# Patient Record
Sex: MALE | Race: WHITE | NOT HISPANIC OR LATINO | Employment: OTHER | URBAN - NONMETROPOLITAN AREA
[De-identification: names, ages, dates, MRNs, and addresses within clinical notes are randomized per-mention and may not be internally consistent; named-entity substitution may affect disease eponyms.]

---

## 2017-09-25 ENCOUNTER — APPOINTMENT (OUTPATIENT)
Dept: GENERAL RADIOLOGY | Facility: HOSPITAL | Age: 82
End: 2017-09-25

## 2017-09-25 PROCEDURE — 71101 X-RAY EXAM UNILAT RIBS/CHEST: CPT

## 2018-09-08 ENCOUNTER — HOSPITAL ENCOUNTER (INPATIENT)
Age: 83
LOS: 5 days | Discharge: NURSING, REHAB OR LONG-TERM CARE | DRG: 481 | End: 2018-09-13
Attending: EMERGENCY MEDICINE | Admitting: INTERNAL MEDICINE
Payer: MEDICARE

## 2018-09-08 ENCOUNTER — APPOINTMENT (OUTPATIENT)
Dept: GENERAL RADIOLOGY | Age: 83
DRG: 481 | End: 2018-09-08
Payer: MEDICARE

## 2018-09-08 DIAGNOSIS — S72.001A CLOSED FRACTURE OF RIGHT HIP, INITIAL ENCOUNTER (HCC): Primary | ICD-10-CM

## 2018-09-08 PROBLEM — S72.001S: Status: ACTIVE | Noted: 2018-09-08

## 2018-09-08 LAB
ANION GAP SERPL CALCULATED.3IONS-SCNC: 18 MMOL/L (ref 7–19)
APTT: 30.4 SEC (ref 26–36.2)
BUN BLDV-MCNC: 16 MG/DL (ref 8–23)
CALCIUM SERPL-MCNC: 8.9 MG/DL (ref 8.2–9.6)
CHLORIDE BLD-SCNC: 100 MMOL/L (ref 98–111)
CO2: 21 MMOL/L (ref 22–29)
CREAT SERPL-MCNC: 0.8 MG/DL (ref 0.5–1.2)
ETHANOL: 33 MG/DL (ref 0–0.08)
GFR NON-AFRICAN AMERICAN: >60
GLUCOSE BLD-MCNC: 149 MG/DL (ref 74–109)
HCT VFR BLD CALC: 50.7 % (ref 42–52)
HEMOGLOBIN: 17.1 G/DL (ref 14–18)
INR BLD: 1.01 (ref 0.88–1.18)
MCH RBC QN AUTO: 34.7 PG (ref 27–31)
MCHC RBC AUTO-ENTMCNC: 33.7 G/DL (ref 33–37)
MCV RBC AUTO: 102.8 FL (ref 80–94)
PDW BLD-RTO: 13.4 % (ref 11.5–14.5)
PLATELET # BLD: 143 K/UL (ref 130–400)
PMV BLD AUTO: 9.9 FL (ref 9.4–12.4)
POTASSIUM SERPL-SCNC: 4.2 MMOL/L (ref 3.5–5)
PROTHROMBIN TIME: 13.2 SEC (ref 12–14.6)
RBC # BLD: 4.93 M/UL (ref 4.7–6.1)
SODIUM BLD-SCNC: 139 MMOL/L (ref 136–145)
TROPONIN: 0.04 NG/ML (ref 0–0.03)
WBC # BLD: 9.3 K/UL (ref 4.8–10.8)

## 2018-09-08 PROCEDURE — 96374 THER/PROPH/DIAG INJ IV PUSH: CPT

## 2018-09-08 PROCEDURE — 99285 EMERGENCY DEPT VISIT HI MDM: CPT

## 2018-09-08 PROCEDURE — 80048 BASIC METABOLIC PNL TOTAL CA: CPT

## 2018-09-08 PROCEDURE — 36415 COLL VENOUS BLD VENIPUNCTURE: CPT

## 2018-09-08 PROCEDURE — 73502 X-RAY EXAM HIP UNI 2-3 VIEWS: CPT

## 2018-09-08 PROCEDURE — 1210000000 HC MED SURG R&B

## 2018-09-08 PROCEDURE — 93005 ELECTROCARDIOGRAM TRACING: CPT

## 2018-09-08 PROCEDURE — 85730 THROMBOPLASTIN TIME PARTIAL: CPT

## 2018-09-08 PROCEDURE — 71045 X-RAY EXAM CHEST 1 VIEW: CPT

## 2018-09-08 PROCEDURE — 85027 COMPLETE CBC AUTOMATED: CPT

## 2018-09-08 PROCEDURE — 84484 ASSAY OF TROPONIN QUANT: CPT

## 2018-09-08 PROCEDURE — G0480 DRUG TEST DEF 1-7 CLASSES: HCPCS

## 2018-09-08 PROCEDURE — 6360000002 HC RX W HCPCS: Performed by: EMERGENCY MEDICINE

## 2018-09-08 PROCEDURE — 85610 PROTHROMBIN TIME: CPT

## 2018-09-08 RX ORDER — DIPHENHYDRAMINE HCL 25 MG
25 TABLET ORAL DAILY
COMMUNITY
End: 2020-09-18

## 2018-09-08 RX ORDER — MORPHINE SULFATE/0.9% NACL/PF 1 MG/ML
2 SYRINGE (ML) INJECTION ONCE
Status: COMPLETED | OUTPATIENT
Start: 2018-09-08 | End: 2018-09-08

## 2018-09-08 RX ADMIN — Medication 2 MG: at 23:45

## 2018-09-08 ASSESSMENT — ENCOUNTER SYMPTOMS
SHORTNESS OF BREATH: 0
DIARRHEA: 0
ABDOMINAL PAIN: 0
BACK PAIN: 0
VOMITING: 0

## 2018-09-08 ASSESSMENT — PAIN SCALES - GENERAL
PAINLEVEL_OUTOF10: 8
PAINLEVEL_OUTOF10: 3

## 2018-09-08 ASSESSMENT — PAIN DESCRIPTION - ORIENTATION: ORIENTATION: RIGHT

## 2018-09-08 ASSESSMENT — PAIN DESCRIPTION - LOCATION: LOCATION: HIP

## 2018-09-09 ENCOUNTER — ANESTHESIA EVENT (OUTPATIENT)
Dept: OPERATING ROOM | Age: 83
DRG: 481 | End: 2018-09-09
Payer: MEDICARE

## 2018-09-09 ENCOUNTER — APPOINTMENT (OUTPATIENT)
Dept: CT IMAGING | Age: 83
DRG: 481 | End: 2018-09-09
Payer: MEDICARE

## 2018-09-09 ENCOUNTER — APPOINTMENT (OUTPATIENT)
Dept: GENERAL RADIOLOGY | Age: 83
DRG: 481 | End: 2018-09-09
Payer: MEDICARE

## 2018-09-09 ENCOUNTER — ANESTHESIA (OUTPATIENT)
Dept: OPERATING ROOM | Age: 83
DRG: 481 | End: 2018-09-09
Payer: MEDICARE

## 2018-09-09 VITALS
OXYGEN SATURATION: 100 % | TEMPERATURE: 96.6 F | RESPIRATION RATE: 8 BRPM | SYSTOLIC BLOOD PRESSURE: 154 MMHG | DIASTOLIC BLOOD PRESSURE: 85 MMHG

## 2018-09-09 PROBLEM — Z87.81 S/P RIGHT HIP FRACTURE: Status: ACTIVE | Noted: 2018-09-08

## 2018-09-09 LAB
ABO/RH: NORMAL
ANTIBODY SCREEN: NORMAL
BACTERIA: NEGATIVE /HPF
BILIRUBIN URINE: NEGATIVE
BILIRUBIN URINE: NEGATIVE
BLOOD, URINE: ABNORMAL
BLOOD, URINE: NEGATIVE
CLARITY: CLEAR
CLARITY: CLEAR
COLOR: ABNORMAL
COLOR: YELLOW
EPITHELIAL CELLS, UA: 1 /HPF (ref 0–5)
GLUCOSE URINE: 500 MG/DL
GLUCOSE URINE: NEGATIVE MG/DL
HYALINE CASTS: 2 /HPF (ref 0–8)
KETONES, URINE: NEGATIVE MG/DL
KETONES, URINE: NEGATIVE MG/DL
LEUKOCYTE ESTERASE, URINE: NEGATIVE
LEUKOCYTE ESTERASE, URINE: NEGATIVE
NITRITE, URINE: NEGATIVE
NITRITE, URINE: NEGATIVE
PH UA: 6
PH UA: 6
PROTEIN UA: ABNORMAL MG/DL
PROTEIN UA: NEGATIVE MG/DL
RBC UA: 11 /HPF (ref 0–4)
SPECIFIC GRAVITY UA: 1.02
SPECIFIC GRAVITY UA: 1.02
URINE REFLEX TO CULTURE: ABNORMAL
UROBILINOGEN, URINE: 0.2 E.U./DL
UROBILINOGEN, URINE: 0.2 E.U./DL
WBC UA: 3 /HPF (ref 0–5)

## 2018-09-09 PROCEDURE — 6360000002 HC RX W HCPCS: Performed by: ORTHOPAEDIC SURGERY

## 2018-09-09 PROCEDURE — 1210000000 HC MED SURG R&B

## 2018-09-09 PROCEDURE — 7100000000 HC PACU RECOVERY - FIRST 15 MIN: Performed by: ORTHOPAEDIC SURGERY

## 2018-09-09 PROCEDURE — 7100000001 HC PACU RECOVERY - ADDTL 15 MIN: Performed by: ORTHOPAEDIC SURGERY

## 2018-09-09 PROCEDURE — 86901 BLOOD TYPING SEROLOGIC RH(D): CPT

## 2018-09-09 PROCEDURE — G8979 MOBILITY GOAL STATUS: HCPCS

## 2018-09-09 PROCEDURE — 97162 PT EVAL MOD COMPLEX 30 MIN: CPT

## 2018-09-09 PROCEDURE — 99285 EMERGENCY DEPT VISIT HI MDM: CPT | Performed by: EMERGENCY MEDICINE

## 2018-09-09 PROCEDURE — 73502 X-RAY EXAM HIP UNI 2-3 VIEWS: CPT

## 2018-09-09 PROCEDURE — 70450 CT HEAD/BRAIN W/O DYE: CPT

## 2018-09-09 PROCEDURE — 3700000001 HC ADD 15 MINUTES (ANESTHESIA): Performed by: ORTHOPAEDIC SURGERY

## 2018-09-09 PROCEDURE — 2580000003 HC RX 258: Performed by: INTERNAL MEDICINE

## 2018-09-09 PROCEDURE — 6360000002 HC RX W HCPCS

## 2018-09-09 PROCEDURE — 3700000000 HC ANESTHESIA ATTENDED CARE: Performed by: ORTHOPAEDIC SURGERY

## 2018-09-09 PROCEDURE — 2709999900 HC NON-CHARGEABLE SUPPLY: Performed by: ORTHOPAEDIC SURGERY

## 2018-09-09 PROCEDURE — 72125 CT NECK SPINE W/O DYE: CPT

## 2018-09-09 PROCEDURE — 86850 RBC ANTIBODY SCREEN: CPT

## 2018-09-09 PROCEDURE — 3600000004 HC SURGERY LEVEL 4 BASE: Performed by: ORTHOPAEDIC SURGERY

## 2018-09-09 PROCEDURE — 97750 PHYSICAL PERFORMANCE TEST: CPT

## 2018-09-09 PROCEDURE — 6370000000 HC RX 637 (ALT 250 FOR IP): Performed by: ORTHOPAEDIC SURGERY

## 2018-09-09 PROCEDURE — 86900 BLOOD TYPING SEROLOGIC ABO: CPT

## 2018-09-09 PROCEDURE — 3209999900 FLUORO FOR SURGICAL PROCEDURES

## 2018-09-09 PROCEDURE — 94664 DEMO&/EVAL PT USE INHALER: CPT

## 2018-09-09 PROCEDURE — 36415 COLL VENOUS BLD VENIPUNCTURE: CPT

## 2018-09-09 PROCEDURE — 0QS604Z REPOSITION RIGHT UPPER FEMUR WITH INTERNAL FIXATION DEVICE, OPEN APPROACH: ICD-10-PCS | Performed by: ORTHOPAEDIC SURGERY

## 2018-09-09 PROCEDURE — G8978 MOBILITY CURRENT STATUS: HCPCS

## 2018-09-09 PROCEDURE — 3600000014 HC SURGERY LEVEL 4 ADDTL 15MIN: Performed by: ORTHOPAEDIC SURGERY

## 2018-09-09 PROCEDURE — 2720000010 HC SURG SUPPLY STERILE: Performed by: ORTHOPAEDIC SURGERY

## 2018-09-09 PROCEDURE — 81003 URINALYSIS AUTO W/O SCOPE: CPT

## 2018-09-09 PROCEDURE — C1713 ANCHOR/SCREW BN/BN,TIS/BN: HCPCS | Performed by: ORTHOPAEDIC SURGERY

## 2018-09-09 PROCEDURE — 2500000003 HC RX 250 WO HCPCS: Performed by: ANESTHESIOLOGY

## 2018-09-09 PROCEDURE — 6360000002 HC RX W HCPCS: Performed by: ANESTHESIOLOGY

## 2018-09-09 PROCEDURE — 99222 1ST HOSP IP/OBS MODERATE 55: CPT | Performed by: INTERNAL MEDICINE

## 2018-09-09 PROCEDURE — 51798 US URINE CAPACITY MEASURE: CPT

## 2018-09-09 PROCEDURE — 2580000003 HC RX 258: Performed by: ANESTHESIOLOGY

## 2018-09-09 PROCEDURE — 2720000001 HC MISC SURG SUPPLY STERILE $51-500: Performed by: ORTHOPAEDIC SURGERY

## 2018-09-09 PROCEDURE — 2580000003 HC RX 258: Performed by: ORTHOPAEDIC SURGERY

## 2018-09-09 DEVICE — ZNN CMN NAIL 11.5MMX21.5CM 130R
Type: IMPLANTABLE DEVICE | Site: HIP | Status: FUNCTIONAL
Brand: ZIMMER® NATURAL NAIL® SYSTEM

## 2018-09-09 DEVICE — ZNN CMN LAG SCREW 10.5X110
Type: IMPLANTABLE DEVICE | Site: HIP | Status: FUNCTIONAL
Brand: ZIMMER® NATURAL NAIL® SYSTEM

## 2018-09-09 DEVICE — SCREW BNE L32MM DIA5MM HEX HD DIA3.5MM CORT TIB TI ALLY FIX: Type: IMPLANTABLE DEVICE | Site: HIP | Status: FUNCTIONAL

## 2018-09-09 RX ORDER — ROCURONIUM BROMIDE 10 MG/ML
INJECTION, SOLUTION INTRAVENOUS PRN
Status: DISCONTINUED | OUTPATIENT
Start: 2018-09-09 | End: 2018-09-09 | Stop reason: SDUPTHER

## 2018-09-09 RX ORDER — SODIUM CHLORIDE 9 MG/ML
INJECTION, SOLUTION INTRAVENOUS CONTINUOUS
Status: DISCONTINUED | OUTPATIENT
Start: 2018-09-09 | End: 2018-09-09

## 2018-09-09 RX ORDER — ACETAMINOPHEN 325 MG/1
650 TABLET ORAL EVERY 4 HOURS PRN
Status: DISCONTINUED | OUTPATIENT
Start: 2018-09-09 | End: 2018-09-13 | Stop reason: HOSPADM

## 2018-09-09 RX ORDER — DIPHENHYDRAMINE HYDROCHLORIDE 50 MG/ML
12.5 INJECTION INTRAMUSCULAR; INTRAVENOUS
Status: DISCONTINUED | OUTPATIENT
Start: 2018-09-09 | End: 2018-09-09 | Stop reason: HOSPADM

## 2018-09-09 RX ORDER — HYDROMORPHONE HCL IN 0.9% NACL 0.5 MG/ML
0.25 SYRINGE (ML) INTRAVENOUS EVERY 5 MIN PRN
Status: DISCONTINUED | OUTPATIENT
Start: 2018-09-09 | End: 2018-09-09 | Stop reason: HOSPADM

## 2018-09-09 RX ORDER — CEFAZOLIN SODIUM 1 G/3ML
INJECTION, POWDER, FOR SOLUTION INTRAMUSCULAR; INTRAVENOUS PRN
Status: DISCONTINUED | OUTPATIENT
Start: 2018-09-09 | End: 2018-09-09 | Stop reason: SDUPTHER

## 2018-09-09 RX ORDER — TAMSULOSIN HYDROCHLORIDE 0.4 MG/1
0.4 CAPSULE ORAL DAILY
Status: DISCONTINUED | OUTPATIENT
Start: 2018-09-09 | End: 2018-09-13 | Stop reason: HOSPADM

## 2018-09-09 RX ORDER — OXYCODONE HYDROCHLORIDE AND ACETAMINOPHEN 5; 325 MG/1; MG/1
1 TABLET ORAL EVERY 4 HOURS PRN
Status: DISCONTINUED | OUTPATIENT
Start: 2018-09-09 | End: 2018-09-13 | Stop reason: HOSPADM

## 2018-09-09 RX ORDER — MORPHINE SULFATE/0.9% NACL/PF 1 MG/ML
2 SYRINGE (ML) INJECTION EVERY 5 MIN PRN
Status: DISCONTINUED | OUTPATIENT
Start: 2018-09-09 | End: 2018-09-09 | Stop reason: HOSPADM

## 2018-09-09 RX ORDER — MORPHINE SULFATE/0.9% NACL/PF 1 MG/ML
4 SYRINGE (ML) INJECTION EVERY 5 MIN PRN
Status: DISCONTINUED | OUTPATIENT
Start: 2018-09-09 | End: 2018-09-09 | Stop reason: HOSPADM

## 2018-09-09 RX ORDER — SODIUM CHLORIDE, SODIUM LACTATE, POTASSIUM CHLORIDE, CALCIUM CHLORIDE 600; 310; 30; 20 MG/100ML; MG/100ML; MG/100ML; MG/100ML
INJECTION, SOLUTION INTRAVENOUS CONTINUOUS PRN
Status: DISCONTINUED | OUTPATIENT
Start: 2018-09-09 | End: 2018-09-09 | Stop reason: SDUPTHER

## 2018-09-09 RX ORDER — GLYCOPYRROLATE 0.2 MG/ML
INJECTION INTRAMUSCULAR; INTRAVENOUS PRN
Status: DISCONTINUED | OUTPATIENT
Start: 2018-09-09 | End: 2018-09-09 | Stop reason: SDUPTHER

## 2018-09-09 RX ORDER — HYDROMORPHONE HCL IN 0.9% NACL 0.5 MG/ML
0.5 SYRINGE (ML) INTRAVENOUS EVERY 5 MIN PRN
Status: DISCONTINUED | OUTPATIENT
Start: 2018-09-09 | End: 2018-09-09 | Stop reason: HOSPADM

## 2018-09-09 RX ORDER — ONDANSETRON 2 MG/ML
4 INJECTION INTRAMUSCULAR; INTRAVENOUS EVERY 6 HOURS PRN
Status: DISCONTINUED | OUTPATIENT
Start: 2018-09-09 | End: 2018-09-13 | Stop reason: HOSPADM

## 2018-09-09 RX ORDER — EPHEDRINE SULFATE 50 MG/ML
INJECTION, SOLUTION INTRAVENOUS PRN
Status: DISCONTINUED | OUTPATIENT
Start: 2018-09-09 | End: 2018-09-09 | Stop reason: SDUPTHER

## 2018-09-09 RX ORDER — SODIUM CHLORIDE, SODIUM LACTATE, POTASSIUM CHLORIDE, CALCIUM CHLORIDE 600; 310; 30; 20 MG/100ML; MG/100ML; MG/100ML; MG/100ML
INJECTION, SOLUTION INTRAVENOUS CONTINUOUS
Status: DISCONTINUED | OUTPATIENT
Start: 2018-09-09 | End: 2018-09-12

## 2018-09-09 RX ORDER — ROPIVACAINE HYDROCHLORIDE 2 MG/ML
INJECTION, SOLUTION EPIDURAL; INFILTRATION; PERINEURAL PRN
Status: DISCONTINUED | OUTPATIENT
Start: 2018-09-09 | End: 2018-09-09 | Stop reason: HOSPADM

## 2018-09-09 RX ORDER — LIDOCAINE HYDROCHLORIDE 10 MG/ML
INJECTION, SOLUTION EPIDURAL; INFILTRATION; INTRACAUDAL; PERINEURAL PRN
Status: DISCONTINUED | OUTPATIENT
Start: 2018-09-09 | End: 2018-09-09 | Stop reason: SDUPTHER

## 2018-09-09 RX ORDER — ONDANSETRON 2 MG/ML
4 INJECTION INTRAMUSCULAR; INTRAVENOUS EVERY 6 HOURS PRN
Status: DISCONTINUED | OUTPATIENT
Start: 2018-09-09 | End: 2018-09-09

## 2018-09-09 RX ORDER — FENTANYL CITRATE 50 UG/ML
INJECTION, SOLUTION INTRAMUSCULAR; INTRAVENOUS PRN
Status: DISCONTINUED | OUTPATIENT
Start: 2018-09-09 | End: 2018-09-09 | Stop reason: SDUPTHER

## 2018-09-09 RX ORDER — DIPHENHYDRAMINE HCL 25 MG
25 TABLET ORAL DAILY
Status: DISCONTINUED | OUTPATIENT
Start: 2018-09-09 | End: 2018-09-13 | Stop reason: HOSPADM

## 2018-09-09 RX ORDER — SODIUM CHLORIDE 0.9 % (FLUSH) 0.9 %
10 SYRINGE (ML) INJECTION PRN
Status: DISCONTINUED | OUTPATIENT
Start: 2018-09-09 | End: 2018-09-13 | Stop reason: HOSPADM

## 2018-09-09 RX ORDER — FLUTICASONE PROPIONATE 50 MCG
2 SPRAY, SUSPENSION (ML) NASAL DAILY
Status: DISCONTINUED | OUTPATIENT
Start: 2018-09-09 | End: 2018-09-13 | Stop reason: HOSPADM

## 2018-09-09 RX ORDER — TRANEXAMIC ACID 100 MG/ML
15 INJECTION, SOLUTION INTRAVENOUS ONCE
Status: CANCELLED | OUTPATIENT
Start: 2018-09-09 | End: 2018-09-09

## 2018-09-09 RX ORDER — MORPHINE SULFATE/0.9% NACL/PF 1 MG/ML
4 SYRINGE (ML) INJECTION
Status: DISCONTINUED | OUTPATIENT
Start: 2018-09-09 | End: 2018-09-13 | Stop reason: HOSPADM

## 2018-09-09 RX ORDER — METOCLOPRAMIDE HYDROCHLORIDE 5 MG/ML
10 INJECTION INTRAMUSCULAR; INTRAVENOUS
Status: DISCONTINUED | OUTPATIENT
Start: 2018-09-09 | End: 2018-09-09 | Stop reason: HOSPADM

## 2018-09-09 RX ORDER — MEPERIDINE HYDROCHLORIDE 50 MG/ML
12.5 INJECTION INTRAMUSCULAR; INTRAVENOUS; SUBCUTANEOUS EVERY 5 MIN PRN
Status: DISCONTINUED | OUTPATIENT
Start: 2018-09-09 | End: 2018-09-09 | Stop reason: HOSPADM

## 2018-09-09 RX ORDER — LABETALOL HYDROCHLORIDE 5 MG/ML
5 INJECTION, SOLUTION INTRAVENOUS EVERY 10 MIN PRN
Status: DISCONTINUED | OUTPATIENT
Start: 2018-09-09 | End: 2018-09-09 | Stop reason: HOSPADM

## 2018-09-09 RX ORDER — DEXAMETHASONE SODIUM PHOSPHATE 10 MG/ML
INJECTION INTRAMUSCULAR; INTRAVENOUS PRN
Status: DISCONTINUED | OUTPATIENT
Start: 2018-09-09 | End: 2018-09-09 | Stop reason: SDUPTHER

## 2018-09-09 RX ORDER — 0.9 % SODIUM CHLORIDE 0.9 %
500 INTRAVENOUS SOLUTION INTRAVENOUS PRN
Status: DISCONTINUED | OUTPATIENT
Start: 2018-09-09 | End: 2018-09-13 | Stop reason: HOSPADM

## 2018-09-09 RX ORDER — DOCUSATE SODIUM 100 MG/1
100 CAPSULE, LIQUID FILLED ORAL 2 TIMES DAILY
Status: DISCONTINUED | OUTPATIENT
Start: 2018-09-09 | End: 2018-09-13 | Stop reason: HOSPADM

## 2018-09-09 RX ORDER — MORPHINE SULFATE/0.9% NACL/PF 1 MG/ML
2 SYRINGE (ML) INJECTION
Status: DISCONTINUED | OUTPATIENT
Start: 2018-09-09 | End: 2018-09-13 | Stop reason: HOSPADM

## 2018-09-09 RX ORDER — SODIUM CHLORIDE 0.9 % (FLUSH) 0.9 %
10 SYRINGE (ML) INJECTION EVERY 12 HOURS SCHEDULED
Status: DISCONTINUED | OUTPATIENT
Start: 2018-09-09 | End: 2018-09-13 | Stop reason: HOSPADM

## 2018-09-09 RX ORDER — MORPHINE SULFATE/0.9% NACL/PF 1 MG/ML
1 SYRINGE (ML) INJECTION EVERY 4 HOURS PRN
Status: DISCONTINUED | OUTPATIENT
Start: 2018-09-09 | End: 2018-09-09

## 2018-09-09 RX ORDER — BISACODYL 10 MG
10 SUPPOSITORY, RECTAL RECTAL DAILY PRN
Status: DISCONTINUED | OUTPATIENT
Start: 2018-09-09 | End: 2018-09-13 | Stop reason: HOSPADM

## 2018-09-09 RX ORDER — ONDANSETRON 2 MG/ML
INJECTION INTRAMUSCULAR; INTRAVENOUS PRN
Status: DISCONTINUED | OUTPATIENT
Start: 2018-09-09 | End: 2018-09-09 | Stop reason: SDUPTHER

## 2018-09-09 RX ORDER — SODIUM CHLORIDE 9 MG/ML
INJECTION, SOLUTION INTRAVENOUS CONTINUOUS PRN
Status: DISCONTINUED | OUTPATIENT
Start: 2018-09-09 | End: 2018-09-09 | Stop reason: SDUPTHER

## 2018-09-09 RX ORDER — PROPOFOL 10 MG/ML
INJECTION, EMULSION INTRAVENOUS PRN
Status: DISCONTINUED | OUTPATIENT
Start: 2018-09-09 | End: 2018-09-09 | Stop reason: SDUPTHER

## 2018-09-09 RX ORDER — ENALAPRILAT 2.5 MG/2ML
1.25 INJECTION INTRAVENOUS
Status: DISCONTINUED | OUTPATIENT
Start: 2018-09-09 | End: 2018-09-09 | Stop reason: HOSPADM

## 2018-09-09 RX ORDER — OXYCODONE HYDROCHLORIDE AND ACETAMINOPHEN 5; 325 MG/1; MG/1
2 TABLET ORAL EVERY 4 HOURS PRN
Status: DISCONTINUED | OUTPATIENT
Start: 2018-09-09 | End: 2018-09-13 | Stop reason: HOSPADM

## 2018-09-09 RX ORDER — HYDRALAZINE HYDROCHLORIDE 20 MG/ML
5 INJECTION INTRAMUSCULAR; INTRAVENOUS EVERY 10 MIN PRN
Status: DISCONTINUED | OUTPATIENT
Start: 2018-09-09 | End: 2018-09-09 | Stop reason: HOSPADM

## 2018-09-09 RX ORDER — PROMETHAZINE HYDROCHLORIDE 25 MG/ML
6.25 INJECTION, SOLUTION INTRAMUSCULAR; INTRAVENOUS
Status: DISCONTINUED | OUTPATIENT
Start: 2018-09-09 | End: 2018-09-09 | Stop reason: HOSPADM

## 2018-09-09 RX ADMIN — SODIUM CHLORIDE, POTASSIUM CHLORIDE, SODIUM LACTATE AND CALCIUM CHLORIDE: 600; 310; 30; 20 INJECTION, SOLUTION INTRAVENOUS at 22:24

## 2018-09-09 RX ADMIN — DOCUSATE SODIUM 100 MG: 100 CAPSULE, LIQUID FILLED ORAL at 20:20

## 2018-09-09 RX ADMIN — ACETAMINOPHEN 325 MG: 325 TABLET ORAL at 14:02

## 2018-09-09 RX ADMIN — NEOSTIGMINE METHYLSULFATE 4 MG: 1 INJECTION, SOLUTION INTRAMUSCULAR; INTRAVENOUS; SUBCUTANEOUS at 09:10

## 2018-09-09 RX ADMIN — TAMSULOSIN HYDROCHLORIDE 0.4 MG: 0.4 CAPSULE ORAL at 20:20

## 2018-09-09 RX ADMIN — CEFAZOLIN 2000 MG: 330 INJECTION, POWDER, FOR SOLUTION INTRAMUSCULAR; INTRAVENOUS at 08:38

## 2018-09-09 RX ADMIN — EPHEDRINE SULFATE 10 MG: 50 INJECTION, SOLUTION INTRAMUSCULAR; INTRAVENOUS; SUBCUTANEOUS at 08:40

## 2018-09-09 RX ADMIN — FENTANYL CITRATE 25 MCG: 50 INJECTION INTRAMUSCULAR; INTRAVENOUS at 08:20

## 2018-09-09 RX ADMIN — FENTANYL CITRATE 25 MCG: 50 INJECTION INTRAMUSCULAR; INTRAVENOUS at 08:45

## 2018-09-09 RX ADMIN — DEXAMETHASONE SODIUM PHOSPHATE 10 MG: 10 INJECTION INTRAMUSCULAR; INTRAVENOUS at 08:40

## 2018-09-09 RX ADMIN — FENTANYL CITRATE 50 MCG: 50 INJECTION INTRAMUSCULAR; INTRAVENOUS at 08:50

## 2018-09-09 RX ADMIN — ROCURONIUM BROMIDE 30 MG: 10 INJECTION INTRAVENOUS at 08:20

## 2018-09-09 RX ADMIN — RIVAROXABAN 10 MG: 10 TABLET, FILM COATED ORAL at 17:39

## 2018-09-09 RX ADMIN — PROPOFOL 150 MG: 10 INJECTION, EMULSION INTRAVENOUS at 08:20

## 2018-09-09 RX ADMIN — Medication 2 G: at 22:59

## 2018-09-09 RX ADMIN — SODIUM CHLORIDE: 9 INJECTION, SOLUTION INTRAVENOUS at 09:16

## 2018-09-09 RX ADMIN — FENTANYL CITRATE 50 MCG: 50 INJECTION INTRAMUSCULAR; INTRAVENOUS at 09:15

## 2018-09-09 RX ADMIN — Medication 2 G: at 17:39

## 2018-09-09 RX ADMIN — GLYCOPYRROLATE 0.6 MG: 0.2 INJECTION, SOLUTION INTRAMUSCULAR; INTRAVENOUS at 09:10

## 2018-09-09 RX ADMIN — ONDANSETRON HYDROCHLORIDE 4 MG: 2 INJECTION, SOLUTION INTRAMUSCULAR; INTRAVENOUS at 09:15

## 2018-09-09 RX ADMIN — LIDOCAINE HYDROCHLORIDE 50 MG: 10 INJECTION, SOLUTION EPIDURAL; INFILTRATION; INTRACAUDAL; PERINEURAL at 08:20

## 2018-09-09 RX ADMIN — Medication 2 MG: at 10:08

## 2018-09-09 RX ADMIN — EPHEDRINE SULFATE 5 MG: 50 INJECTION, SOLUTION INTRAMUSCULAR; INTRAVENOUS; SUBCUTANEOUS at 09:05

## 2018-09-09 RX ADMIN — SODIUM CHLORIDE, SODIUM LACTATE, POTASSIUM CHLORIDE, AND CALCIUM CHLORIDE: 600; 310; 30; 20 INJECTION, SOLUTION INTRAVENOUS at 08:20

## 2018-09-09 RX ADMIN — SODIUM CHLORIDE: 9 INJECTION, SOLUTION INTRAVENOUS at 02:48

## 2018-09-09 RX ADMIN — SODIUM CHLORIDE, POTASSIUM CHLORIDE, SODIUM LACTATE AND CALCIUM CHLORIDE: 600; 310; 30; 20 INJECTION, SOLUTION INTRAVENOUS at 14:02

## 2018-09-09 ASSESSMENT — LIFESTYLE VARIABLES: SMOKING_STATUS: 0

## 2018-09-09 ASSESSMENT — ENCOUNTER SYMPTOMS
SPUTUM PRODUCTION: 0
HEMOPTYSIS: 0
HEARTBURN: 0
COUGH: 0
ABDOMINAL PAIN: 0
ORTHOPNEA: 0
BLURRED VISION: 0
DOUBLE VISION: 0
VOMITING: 0
NAUSEA: 0

## 2018-09-09 ASSESSMENT — PAIN DESCRIPTION - LOCATION
LOCATION: HIP
LOCATION: HIP

## 2018-09-09 ASSESSMENT — PAIN SCALES - GENERAL
PAINLEVEL_OUTOF10: 5
PAINLEVEL_OUTOF10: 4
PAINLEVEL_OUTOF10: 6
PAINLEVEL_OUTOF10: 4

## 2018-09-09 ASSESSMENT — PAIN DESCRIPTION - ORIENTATION
ORIENTATION: RIGHT
ORIENTATION: RIGHT

## 2018-09-09 ASSESSMENT — PAIN DESCRIPTION - PAIN TYPE: TYPE: SURGICAL PAIN

## 2018-09-09 NOTE — PROGRESS NOTES
Physical Therapy    Facility/Department: [facility] SURG SERVICES  Initial Assessment    NAME: Margarito Silvestre  : 6/15/1925  MRN: [id number]    Date of Service: 2018    Discharge Recommendations:  Continue to assess pending progress, Patient would benefit from continued therapy after discharge        Patient Diagnosis(es): The encounter diagnosis was Closed fracture of right hip, initial encounter ([facility] [address] has a past medical history of BPH (benign prostatic hypertrophy).   has a past surgical history that includes other surgical history (8/28/15); Appendectomy; Colon surgery; Colonoscopy (2015); Upper gastrointestinal endoscopy (2015); and Prostate surgery (10/07/2015). Restrictions  Restrictions/Precautions  Restrictions/Precautions: Fall Risk, Weight Bearing  Lower Extremity Weight Bearing Restrictions  Right Lower Extremity Weight Bearing: Partial Weight Bearing  Partial Weight Bearing Percentage Or Pounds: 25%  Position Activity Restriction  Other position/activity restrictions: no SLR  Vision/Hearing  Vision: Impaired  Vision Exceptions: Wears glasses at all times  Hearing: Exceptions to [facility] Exceptions: Hard of hearing/hearing concerns     Subjective  General  Chart Reviewed: Yes  Patient assessed for rehabilitation services?: Yes  Additional Pertinent Hx: pt. fell on driveway  Response To Previous Treatment: Not applicable  Family / Caregiver Present: Yes  Referring Practitioner: Dr. Michelet Decker  Referral Date : 18  Diagnosis: R displaced IT femur fx  Follows Commands: Impaired  Other (Comment): pt. hard of hearing and comments on every task the PT asks him to do. General Comment  Comments: RNJose PT and present to A with transfer. 18 open reduction and internal fixation with amanda cephalomedullary nail  Subjective  Subjective: Pt. willing to sit up, states we are trying to get him in the olympics.   Pain Screening  Patient Currently in Pain: Yes  Pain Assessment Tool Used: sit to stand  Score: Min A  Functional Limitation: Mobility: Walking and moving around  Mobility: Walking and Moving Around Current Status (): At least 40 percent but less than 60 percent impaired, limited or restricted  Mobility: Walking and Moving Around Goal Status (): At least 1 percent but less than 20 percent impaired, limited or restricted  OutComes Score                                           AM-PAC Score             Goals  Short term goals  Time Frame for Short term goals: 2 wks  Short term goal 1: supine to sit indep  Short term goal 2: sit to stand SBA with SW and PWB 35% RLE. Short term goal 3: bed to chair pivot transfer SBA with SW PWB 25% RLE.   Short term goal 4: amb. 22' with SW PWB 25% RLE SBA  Patient Goals   Patient goals : go home, get stonger       Therapy Time   Individual Concurrent Group Co-treatment   Time In           Time Out           Minutes                   Marilu Suazo PT    Electronically signed by Marilu Suazo PT on 9/9/2018 at 4:31 PM

## 2018-09-09 NOTE — ANESTHESIA PRE PROCEDURE
Counseling given: Not Answered      Vital Signs (Current):   Vitals:    09/09/18 0102 09/09/18 0145 09/09/18 0434 09/09/18 0640   BP: 118/78 (!) 175/102 126/72 124/75   Pulse: 79 92 85 76   Resp:  20 18 16   Temp: 97.4 °F (36.3 °C) 97.7 °F (36.5 °C) 97.4 °F (36.3 °C) 97.9 °F (36.6 °C)   TempSrc: Oral Temporal Temporal Temporal   SpO2: 92% 95% 97% 95%   Weight:  147 lb (66.7 kg)     Height:                                                  BP Readings from Last 3 Encounters:   09/09/18 124/75   12/01/15 122/78   10/15/15 127/74       NPO Status:                                                                                 BMI:   Wt Readings from Last 3 Encounters:   09/09/18 147 lb (66.7 kg)   12/01/15 148 lb (67.1 kg)   10/15/15 136 lb (61.7 kg)     Body mass index is 21.09 kg/m². CBC:   Lab Results   Component Value Date    WBC 9.3 09/08/2018    RBC 4.93 09/08/2018    HGB 17.1 09/08/2018    HCT 50.7 09/08/2018    .8 09/08/2018    RDW 13.4 09/08/2018     09/08/2018       CMP:   Lab Results   Component Value Date     09/08/2018    K 4.2 09/08/2018     09/08/2018    CO2 21 09/08/2018    BUN 16 09/08/2018    CREATININE 0.8 09/08/2018    LABGLOM >60 09/08/2018    GLUCOSE 149 09/08/2018    PROT 7.0 09/17/2015    CALCIUM 8.9 09/08/2018    ALKPHOS 79 09/17/2015    AST 29 09/17/2015    ALT 17 09/17/2015       POC Tests: No results for input(s): POCGLU, POCNA, POCK, POCCL, POCBUN, POCHEMO, POCHCT in the last 72 hours.     Coags:   Lab Results   Component Value Date    PROTIME 13.2 09/08/2018    INR 1.01 09/08/2018    APTT 30.4 09/08/2018       HCG (If Applicable): No results found for: PREGTESTUR, PREGSERUM, HCG, HCGQUANT     ABGs: No results found for: PHART, PO2ART, WZL0FDW, XPB0MOG, BEART, G4DARJLZ     Type & Screen (If Applicable):  No results found for: LUCILLE [facility]    Anesthesia Evaluation  Patient summary reviewed and Nursing notes reviewed history of anesthetic complications (h/o postop delirium):   Airway: Mallampati: II  TM distance: >3 FB   Neck ROM: full  Mouth opening: > = 3 FB Dental:    (+) partials  Comment: 3-4 teeth on bottom, 1 molar upper left    Pulmonary:Negative Pulmonary ROS and normal exam        (-) not a current smoker                           Cardiovascular:Negative CV ROS        (-) past MI and CAD    ECG reviewed               Beta Blocker:  Not on Beta Blocker      ROS comment: RBBB on EKG     Neuro/Psych:   Negative Neuro/Psych ROS              GI/Hepatic/Renal:        (-) GERD, liver disease and no renal disease      ROS comment: S/p partial colon resection. Endo/Other:    (+) : arthritis:., .                 Abdominal:           Vascular: negative vascular ROS. Anesthesia Plan      general     ASA 2 - emergent       Induction: intravenous. MIPS: Postoperative opioids intended and Prophylactic antiemetics administered. Anesthetic plan and risks discussed with patient. Plan discussed with CRNA.                   Gianna Lopez MD   9/9/2018

## 2018-09-09 NOTE — ED NOTES
Pt's right arm cleaned with saline. Bacitracin then applied to pt's right elbow, followed by a mepilex dressing. Pt tolerated well. Pt states, \"Now that feels better. \"     Angel Nieves RN  09/08/18 [id number]

## 2018-09-09 NOTE — PLAN OF CARE
Problem: Falls - Risk of:  Goal: Will remain free from falls  Will remain free from falls   Outcome: Ongoing    Goal: Absence of physical injury  Absence of physical injury   Outcome: Ongoing      Problem: Pain:  Goal: Pain level will decrease  Pain level will decrease  Outcome: Ongoing    Goal: Control of acute pain  Control of acute pain  Outcome: Ongoing

## 2018-09-09 NOTE — ED NOTES
Bed: 18  Expected date:   Expected time:   Means of arrival: [place]  Comments:  EMS: hip pain      Edwar Doyle RN  09/08/18 [id number]

## 2018-09-09 NOTE — H&P
Hospital Medicine    History & Physical      CC: sp fall    History Obtained From:  patient, electronic medical record    HISTORY OF PRESENT ILLNESS:    The patient is a 80 y.o. male who presents to er after a mechanical fall, resulting in right hip fracture, he denies pmh, etoh moderate daily, case d/w orthe possible repair today? No cp no fever, no sob    Past Medical History:        Diagnosis Date    BPH (benign prostatic hypertrophy)        Past Surgical History:        Procedure Laterality Date    APPENDECTOMY      COLON SURGERY      resection    COLONOSCOPY  08/24/2015    Dr. Darlene Conrad HISTORY  8/28/15    open low anterior sigmoid resection    PROSTATE SURGERY  10/07/2015    Dr. Johanne Ibanez  08/24/2015    Dr. Elizabeth Morrow       Medications Prior to Admission:    Prescriptions Prior to Admission: diphenhydrAMINE (BENADRYL) 25 MG tablet, Take 25 mg by mouth daily  Mometasone Furoate (NASONEX NA), by Nasal route    Allergies:  Patient has no known allergies. Social History:   TOBACCO:   reports that he has never smoked. He has never used smokeless tobacco.  ETOH:   reports that he drinks about 12.6 oz of alcohol per week . Patient currently lives with family     Family History:   Family History   Problem Relation Age of Onset    Cancer Mother     Breast Cancer Mother     Colon Cancer Neg Hx     Colon Polyps Neg Hx     Esophageal Cancer Neg Hx     Liver Cancer Neg Hx     Liver Disease Neg Hx     Rectal Cancer Neg Hx     Stomach Cancer Neg Hx        I have personally reviewed above histories  REVIEW OF SYSTEMS:  Review of Systems   Constitutional: Negative for chills, fever and weight loss. HENT: Negative. Eyes: Negative for blurred vision and double vision. Respiratory: Negative for cough, hemoptysis and sputum production. Cardiovascular: Negative for chest pain, palpitations, orthopnea and claudication.    Gastrointestinal: Negative for abdominal pain, heartburn, nausea and vomiting. Genitourinary: Negative. Musculoskeletal: Positive for falls and joint pain. Skin: Negative. Neurological: Negative. Endo/Heme/Allergies: Negative for environmental allergies. Does not bruise/bleed easily. Psychiatric/Behavioral: Negative. PHYSICAL EXAM:  /72   Pulse 85   Temp 97.4 °F (36.3 °C) (Temporal)   Resp 18   Ht 5' 10\" (1.778 m)   Wt 147 lb (66.7 kg)   SpO2 97%   BMI 21.09 kg/m²   Physical Exam   Constitutional: He is oriented to person, place, and time. He appears well-developed and well-nourished. Non-toxic appearance. He does not have a sickly appearance. He does not appear ill. No distress. HENT:   Head: Normocephalic and atraumatic. Eyes: Pupils are equal, round, and reactive to light. Lids are normal.   Neck: Neck supple. No JVD present. Carotid bruit is not present. Cardiovascular: Normal rate and regular rhythm. Pulses:       Carotid pulses are 2+ on the right side, and 2+ on the left side. Radial pulses are 2+ on the right side, and 2+ on the left side. Pulmonary/Chest: Breath sounds normal.   Abdominal: Soft. Bowel sounds are normal. He exhibits no distension. There is no tenderness. Scar abdomen from colonic resection   Musculoskeletal:        Right hip: He exhibits decreased range of motion and tenderness. Right lower leg: He exhibits no swelling and no edema. Left lower leg: He exhibits no swelling and no edema. Neurological: He is alert and oriented to person, place, and time. GCS eye subscore is 4. GCS verbal subscore is 5. GCS motor subscore is 6. Skin: Skin is warm and dry. Psychiatric: He has a normal mood and affect.      DATA:  EKG:  I have reviewed EKG with the following interpretation:  Imaging:    XR HIP 2-3 VW W PELVIS RIGHT    (Results Pending)   XR CHEST PORTABLE    (Results Pending)   CT Head WO Contrast    (Results Pending)   CT Cervical Spine WO Contrast (Results Pending)              Labs Reviewed   CBC - Abnormal; Notable for the following:        Result Value    .8 (*)     MCH 34.7 (*)     All other components within normal limits   BASIC METABOLIC PANEL - Abnormal; Notable for the following:     CO2 21 (*)     Glucose 149 (*)     All other components within normal limits   TROPONIN - Abnormal; Notable for the following:     Troponin 0.04 (*)     All other components within normal limits   PROTIME-INR   ETHANOL   APTT   URINALYSIS          IMPRESSION:    1. Right hip fracture  2. Sp fall  3. Possible etoh     PLAN:     1. Admit floor  2. Ortho consult and follow recommendations  3. Pain control  4. Monitor mental status, may require re-evaluation post op in case needs withdrawal protocol  5. Pt-ot when feasible.     Cr Liu MD    Internal Medicine Hospitalist

## 2018-09-09 NOTE — CONSULTS
strength, normal sensation, good radial pulse and skin is normal.     Right lower extremity exam:  There is  tenderness to palpation about the hip but not the knee, ankle or foot. Pain with attempted passive range of motion of hip . 5/5 strength, normal sensation, good dorsalis pedis pulse  and skin is normal.     Left lower extremity exam:  There is no tenderness to palpation about the hip, knee, ankle or foot. Range of motion normal .   5/5 strength normal sensation, good dorsalis pedis pulse and skin is normal.      DATA:    CBC with Differential:    Lab Results   Component Value Date    WBC 9.3 09/08/2018    RBC 4.93 09/08/2018    HGB 17.1 09/08/2018    HCT 50.7 09/08/2018     09/08/2018    .8 09/08/2018    MCH 34.7 09/08/2018    MCHC 33.7 09/08/2018    RDW 13.4 09/08/2018    LYMPHOPCT 21.1 10/08/2015    MONOPCT 9.9 10/08/2015    BASOPCT 0.5 10/08/2015     CMP:    Lab Results   Component Value Date     09/08/2018    K 4.2 09/08/2018     09/08/2018    CO2 21 09/08/2018    BUN 16 09/08/2018    CREATININE 0.8 09/08/2018    LABGLOM >60 09/08/2018    GLUCOSE 149 09/08/2018    PROT 7.0 09/17/2015    CALCIUM 8.9 09/08/2018    ALKPHOS 79 09/17/2015    AST 29 09/17/2015    ALT 17 09/17/2015     BMP:    Lab Results   Component Value Date     09/08/2018    K 4.2 09/08/2018     09/08/2018    CO2 21 09/08/2018    BUN 16 09/08/2018    CREATININE 0.8 09/08/2018    CALCIUM 8.9 09/08/2018    LABGLOM >60 09/08/2018    GLUCOSE 149 09/08/2018         Radiology:  Xray shows right intertroch femur fracture. Assessment:  right intertroch femur fracture. Plan:  ORIF. I explained to the patient/family the patient's diagnosis and operative procedure in detail. They said they understood basically what was wrong and how I planned to fix it.   They understand the expected recovery and the risks which include excessive bleeding, infection, reaction to anesthesia, nerve injury, stiffness, fracture, deformity and dislocation. They then signed an operative consent form. Provider:  Moises Vinson  Date: 9/9/2018

## 2018-09-09 NOTE — ED NOTES
Pt started c/o head and neck pain. Dr. Dario Hernandez notified and at bedside to talk to pt.       Doyle Prescott RN  09/08/18 6435

## 2018-09-09 NOTE — ED PROVIDER NOTES
HISTORY       Past Surgical History:   Procedure Laterality Date    APPENDECTOMY      COLON SURGERY      resection    COLONOSCOPY  08/24/2015    Dr. Kaleigh Aleman HISTORY  8/28/15    open low anterior sigmoid resection    PROSTATE SURGERY  10/07/2015    Dr. Nelida Moss  08/24/2015    Dr. Alberto Wheeler       Previous Medications    DIPHENHYDRAMINE (BENADRYL) 25 MG TABLET    Take 25 mg by mouth daily    MOMETASONE FUROATE (NASONEX NA)    by Nasal route       ALLERGIES     Patient has no known allergies. FAMILY HISTORY       Family History   Problem Relation Age of Onset    Colon Cancer Neg Hx     Colon Polyps Neg Hx     Esophageal Cancer Neg Hx     Liver Cancer Neg Hx     Liver Disease Neg Hx     Rectal Cancer Neg Hx     Stomach Cancer Neg Hx           SOCIAL HISTORY       Social History     Social History    Marital status:      Spouse name: N/A    Number of children: N/A    Years of education: N/A     Social History Main Topics    Smoking status: Never Smoker    Smokeless tobacco: Never Used    Alcohol use 12.6 oz/week     14 Glasses of wine, 7 Cans of beer per week      Comment: 4 glasses of wine a day    Drug use: No    Sexual activity: Not Asked     Other Topics Concern    None     Social History Narrative    None       SCREENINGS             PHYSICAL EXAM    (up to 7 for level 4, 8 or more for level 5)     ED Triage Vitals   BP Temp Temp Source Pulse Resp SpO2 Height Weight   09/08/18 2200 09/08/18 2200 09/08/18 2200 09/08/18 2200 09/08/18 2150 09/08/18 2200 09/08/18 2150 09/08/18 2150   122/78 98.9 °F (37.2 °C) Oral 77 16 (!) 88 % 5' 10\" (1.778 m) 155 lb (70.3 kg)       Physical Exam   Constitutional: He is oriented to person, place, and time. He appears well-developed. No distress. HENT:   Head: Normocephalic and atraumatic. Eyes: Pupils are equal, round, and reactive to light.  EOM are normal. No scleral icterus. Neck: Normal range of motion and full passive range of motion without pain. Neck supple. No JVD present. No neck rigidity. Cardiovascular: Normal rate, regular rhythm, normal heart sounds and intact distal pulses. Pulmonary/Chest: Effort normal and breath sounds normal. No respiratory distress. Abdominal: Soft. He exhibits no distension. There is no tenderness. There is no rebound and no guarding. Musculoskeletal: He exhibits no edema. Right hip: He exhibits bony tenderness and deformity (RLE shortened). E, T or L-spine tenderness or step-off. Pelvis stable. Tenderness to the posterior hip. No other tenderness in the right lower extremity. No tenderness of any kind in the left arm or leg. Superficial abrasion to the left lower leg. Small skin tear right elbow. No tenderness in the right upper extremity. Neurovascularly intact distally all 4 extremities. Neurological: He is alert and oriented to person, place, and time. Skin: Skin is warm and dry. Psychiatric: He has a normal mood and affect. His behavior is normal.   Vitals reviewed. DIAGNOSTIC RESULTS     EKG: All EKG's are interpreted by the Emergency Department Physician who either signs or Co-signs this chart in the absence of a cardiologist.    Sinus rhythm.   Right bundle branch block    RADIOLOGY:   Non-plain film images such as CT, Ultrasound and MRI are read by the radiologist. Plain radiographic images are visualized and preliminarily interpreted by the emergency physician with the below findings:    Right hip and pelvis: Intertrochanteric right hip fracture    Chest: Chronic changes no acute disease    Interpretation per the Radiologist below, if available at the time of this note:    XR HIP 2-3 VW W PELVIS RIGHT    (Results Pending)   XR CHEST PORTABLE    (Results Pending)         LABS:  Labs Reviewed   CBC   BASIC METABOLIC PANEL   PROTIME-INR       All other labs were within normal range or not returned
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
## 2018-09-10 PROBLEM — S41.111A LACERATION OF ARM, RIGHT, MULTIPLE SITES: Status: ACTIVE | Noted: 2018-09-10

## 2018-09-10 PROBLEM — E87.1 HYPONATREMIA: Status: ACTIVE | Noted: 2018-09-10

## 2018-09-10 PROBLEM — S72.001A CLOSED RIGHT HIP FRACTURE, INITIAL ENCOUNTER (HCC): Status: ACTIVE | Noted: 2018-09-08

## 2018-09-10 PROBLEM — R31.29 MICROSCOPIC HEMATURIA: Status: ACTIVE | Noted: 2018-09-10

## 2018-09-10 PROBLEM — Z78.9 ALCOHOL USE: Status: ACTIVE | Noted: 2018-09-10

## 2018-09-10 LAB
ANION GAP SERPL CALCULATED.3IONS-SCNC: 13 MMOL/L (ref 7–19)
BUN BLDV-MCNC: 17 MG/DL (ref 8–23)
CALCIUM SERPL-MCNC: 8.3 MG/DL (ref 8.2–9.6)
CHLORIDE BLD-SCNC: 98 MMOL/L (ref 98–111)
CO2: 24 MMOL/L (ref 22–29)
CREAT SERPL-MCNC: 0.8 MG/DL (ref 0.5–1.2)
GFR NON-AFRICAN AMERICAN: >60
GLUCOSE BLD-MCNC: 199 MG/DL (ref 74–109)
HCT VFR BLD CALC: 43.9 % (ref 42–52)
HEMOGLOBIN: 14.8 G/DL (ref 14–18)
POTASSIUM SERPL-SCNC: 3.5 MMOL/L (ref 3.5–5)
SODIUM BLD-SCNC: 135 MMOL/L (ref 136–145)
TROPONIN: 0.02 NG/ML (ref 0–0.03)

## 2018-09-10 PROCEDURE — 99233 SBSQ HOSP IP/OBS HIGH 50: CPT | Performed by: FAMILY MEDICINE

## 2018-09-10 PROCEDURE — G8988 SELF CARE GOAL STATUS: HCPCS

## 2018-09-10 PROCEDURE — 2700000000 HC OXYGEN THERAPY PER DAY

## 2018-09-10 PROCEDURE — 81001 URINALYSIS AUTO W/SCOPE: CPT

## 2018-09-10 PROCEDURE — 6370000000 HC RX 637 (ALT 250 FOR IP): Performed by: INTERNAL MEDICINE

## 2018-09-10 PROCEDURE — 80048 BASIC METABOLIC PNL TOTAL CA: CPT

## 2018-09-10 PROCEDURE — 84484 ASSAY OF TROPONIN QUANT: CPT

## 2018-09-10 PROCEDURE — 97530 THERAPEUTIC ACTIVITIES: CPT

## 2018-09-10 PROCEDURE — 36415 COLL VENOUS BLD VENIPUNCTURE: CPT

## 2018-09-10 PROCEDURE — 6370000000 HC RX 637 (ALT 250 FOR IP): Performed by: ORTHOPAEDIC SURGERY

## 2018-09-10 PROCEDURE — 85014 HEMATOCRIT: CPT

## 2018-09-10 PROCEDURE — 6370000000 HC RX 637 (ALT 250 FOR IP): Performed by: FAMILY MEDICINE

## 2018-09-10 PROCEDURE — 85018 HEMOGLOBIN: CPT

## 2018-09-10 PROCEDURE — 1210000000 HC MED SURG R&B

## 2018-09-10 PROCEDURE — 97165 OT EVAL LOW COMPLEX 30 MIN: CPT

## 2018-09-10 PROCEDURE — G8987 SELF CARE CURRENT STATUS: HCPCS

## 2018-09-10 PROCEDURE — 97116 GAIT TRAINING THERAPY: CPT

## 2018-09-10 RX ORDER — BENZONATATE 100 MG/1
100 CAPSULE ORAL 3 TIMES DAILY PRN
Status: DISCONTINUED | OUTPATIENT
Start: 2018-09-10 | End: 2018-09-13 | Stop reason: HOSPADM

## 2018-09-10 RX ORDER — GUAIFENESIN 600 MG/1
600 TABLET, EXTENDED RELEASE ORAL 2 TIMES DAILY
Status: DISCONTINUED | OUTPATIENT
Start: 2018-09-10 | End: 2018-09-13 | Stop reason: HOSPADM

## 2018-09-10 RX ORDER — SIMETHICONE 80 MG
160 TABLET,CHEWABLE ORAL ONCE
Status: COMPLETED | OUTPATIENT
Start: 2018-09-10 | End: 2018-09-10

## 2018-09-10 RX ADMIN — SIMETHICONE CHEW TAB 80 MG 160 MG: 80 TABLET ORAL at 03:58

## 2018-09-10 RX ADMIN — TAMSULOSIN HYDROCHLORIDE 0.4 MG: 0.4 CAPSULE ORAL at 08:23

## 2018-09-10 RX ADMIN — GUAIFENESIN 600 MG: 600 TABLET, EXTENDED RELEASE ORAL at 20:25

## 2018-09-10 RX ADMIN — GUAIFENESIN 600 MG: 600 TABLET, EXTENDED RELEASE ORAL at 10:13

## 2018-09-10 RX ADMIN — DIPHENHYDRAMINE HCL 25 MG: 25 TABLET ORAL at 08:22

## 2018-09-10 RX ADMIN — ACETAMINOPHEN 650 MG: 325 TABLET ORAL at 20:25

## 2018-09-10 RX ADMIN — DOCUSATE SODIUM 100 MG: 100 CAPSULE, LIQUID FILLED ORAL at 08:23

## 2018-09-10 RX ADMIN — RIVAROXABAN 10 MG: 10 TABLET, FILM COATED ORAL at 17:48

## 2018-09-10 RX ADMIN — DOCUSATE SODIUM 100 MG: 100 CAPSULE, LIQUID FILLED ORAL at 20:25

## 2018-09-10 RX ADMIN — FLUTICASONE PROPIONATE 2 SPRAY: 50 SPRAY, METERED NASAL at 08:23

## 2018-09-10 RX ADMIN — BISACODYL 10 MG: 10 SUPPOSITORY RECTAL at 04:56

## 2018-09-10 ASSESSMENT — PAIN DESCRIPTION - ORIENTATION: ORIENTATION: RIGHT

## 2018-09-10 ASSESSMENT — PAIN SCALES - GENERAL
PAINLEVEL_OUTOF10: 3
PAINLEVEL_OUTOF10: 0

## 2018-09-10 ASSESSMENT — PAIN SCALES - WONG BAKER: WONGBAKER_NUMERICALRESPONSE: 4

## 2018-09-10 ASSESSMENT — PAIN DESCRIPTION - PAIN TYPE: TYPE: SURGICAL PAIN

## 2018-09-10 ASSESSMENT — PAIN DESCRIPTION - DESCRIPTORS: DESCRIPTORS: ACHING

## 2018-09-10 ASSESSMENT — PAIN DESCRIPTION - LOCATION: LOCATION: HIP

## 2018-09-10 NOTE — PROGRESS NOTES
72 hours. No results for input(s): AST, ALT, ALB, BILITOT, ALKPHOS, ALB in the last 72 hours. ABGs:No results for input(s): PHART, QIR2EJX, PO2ART, MUX7SMC, BEART, HGBAE, J1GDWAQY, CARBOXHGBART, 02THERAPY in the last 72 hours. HgBA1c: Invalid input(s): HGBA1C  FLP:  No results found for: TRIG, HDL, LDLCALC, LDLDIRECT, LABVLDL  TSH:  No results found for: TSH  Troponin T:   Recent Labs      09/08/18   2255  09/10/18   0615   TROPONINI  0.04*  0.02     INR:   Recent Labs      09/08/18 2258   INR  1.01       Objective:   Vitals: /70   Pulse 86   Temp 97.4 °F (36.3 °C) (Temporal)   Resp 16   Ht 5' 10\" (1.778 m)   Wt 147 lb (66.7 kg)   SpO2 94%   BMI 21.09 kg/m²   24HR INTAKE/OUTPUT:    Intake/Output Summary (Last 24 hours) at 09/10/18 0910  Last data filed at 09/10/18 0359   Gross per 24 hour   Intake          4051.65 ml   Output             1180 ml   Net          2871.65 ml     General appearance: alert and cooperative with exam  HEENT: atraumatic, eyes with clear conjunctiva and normal lids, pupils and irises normal, external ears and nose are normal, lips normal.  Neck without masses, lympadenopathy, bruit, thyroid normal  Lungs: no increased work of breathing, \"clear to auscultation bilaterally\" without rales, rhonchi or wheezes  Heart: regular rate and rhythm, S1, S2 normal, no murmur, click, rub or gallop  Abdomen: soft, non-tender; bowel sounds normal; no masses,  no organomegaly  Extremities: extremities normal, atraumatic, no cyanosis or edema  Neurologic: No focal neurologic deficits, normal sensation, alert and oriented, affect and mood appropriate. Skin: no rashes, nodules. Assessment and Plan: Active Problems:    Closed right hip fracture, initial encounter ([facility] [address]    Hyponatremia    Microscopic hematuria    Fall    Alcohol use    Laceration of arm, right, multiple sites  Resolved Problems:    * No resolved hospital problems. *    Benzonatate 100 mg TID PRN cough.

## 2018-09-10 NOTE — PROGRESS NOTES
Occupational Therapy   Occupational Therapy Initial Assessment  Date: 9/10/2018   Patient Name: Merlin Weinstein  MRN: [id number]     : 6/15/1925    Date of Service: 9/10/2018    Discharge Recommendations:            Patient Diagnosis(es): The encounter diagnosis was Closed fracture of right hip, initial encounter [facility]). has a past medical history of BPH (benign prostatic hypertrophy).   has a past surgical history that includes other surgical history (8/28/15); Appendectomy; Colon surgery; Colonoscopy (2015); Upper gastrointestinal endoscopy (2015); Prostate surgery (10/07/2015); and pr femur/knee surg unlisted (Right, 2018). Treatment Diagnosis: R IT femur fx, ORIF      Restrictions  Restrictions/Precautions  Restrictions/Precautions: Fall Risk, Weight Bearing  Lower Extremity Weight Bearing Restrictions  Right Lower Extremity Weight Bearing: Partial Weight Bearing  Partial Weight Bearing Percentage Or Pounds: 25%  Position Activity Restriction  Other position/activity restrictions: no SLR    Subjective   General  Patient assessed for rehabilitation services?: Yes  Additional Pertinent Hx: 25% WB RLE  Diagnosis: R femur fx., ORIF nail  General Comment  Comments: Pt. on BSC, wanting to return to bed.   Pain Assessment  Patient Currently in Pain: Yes  Pain Assessment: Faces  Ohara-Baker Pain Rating: Hurts little more  Pain Level: 4  Pain Type: Surgical pain  Pain Location: Hip  Pain Orientation: Right  Pain Descriptors: Aching  Pain Intervention(s): Repositioned, Ambulation/Increased activity, RN notified, Cold applied  Response to Pain Intervention: Patient Satisfied     Social/Functional History  Social/Functional History  Lives With: Alone  Type of Home: House  Home Layout: Two level  Home Access: Stairs to enter with rails  Entrance Stairs - Number of Steps: 3  Home Equipment:  (none)  ADL Assistance: Independent  Ambulation Assistance: Independent  Transfer Assistance: Independent  Active : Yes  Occupation: Retired  Additional Comments: pt. has a workout routine he does daily in his garage       Objective   Vision: Impaired  Vision Exceptions: Wears glasses at all times  Hearing: Exceptions to [facility]  Hearing Exceptions: Hard of hearing/hearing concerns    Orientation  Overall Orientation Status: Within Normal Limits     Standing Balance  Sit to stand: Moderate assistance  ADL  Feeding: Independent  Grooming: Independent  UE Bathing: Supervision  LE Bathing: Moderate assistance  UE Dressing: Supervision  LE Dressing: Maximum assistance  Toileting: Maximum assistance           Transfers  Stand Step Transfers: Minimal assistance  Sit to stand: Moderate assistance  Transfer Comments: With s.w. Cognition  Overall Cognitive Status: WNL                 LUE AROM (degrees)  LUE AROM : WNL  RUE AROM (degrees)  RUE AROM : WNL  LUE Strength  Gross LUE Strength: WFL  RUE Strength  Gross RUE Strength: WFL                  Assessment   Assessment: Pt. did well with maintaining 25% WB on RLE. Will progress as tolerated  Treatment Diagnosis: R IT femur fx, ORIF  Prognosis: Good  Decision Making: Low Complexity  REQUIRES OT FOLLOW UP: Yes  Activity Tolerance  Activity Tolerance: Patient limited by pain         Plan   Plan  Times per week: 3-5x/week  Times per day: Daily    G-Code  OT G-codes  Functional Assessment Tool Used: ADLs, transfers  Score: CK  Functional Limitation: Self care  Self Care Current Status (): At least 40 percent but less than 60 percent impaired, limited or restricted  Self Care Goal Status (): At least 1 percent but less than 20 percent impaired, limited or restricted  OutComes Score                                           AM-PAC Score             Goals  Short term goals  Time Frame for Short term goals: 1 week  Short term goal 1: CGA with toilet tfers with s.w.   Short term goal 2: CGA with sit-stand to walker  Short term goal 3: Min A with seated LB dsg  Short term goal 4:

## 2018-09-10 NOTE — PROGRESS NOTES
Occupational Therapy  Facility/Department: [facility] 5 SURG SERVICES  Daily Treatment Note  NAME: Peña Zuñiga  : 6/15/1925  MRN: [id number]    Date of Service: 9/10/2018    Discharge Recommendations:          Patient Diagnosis(es): The encounter diagnosis was Closed fracture of right hip, initial encounter ([facility] [address] has a past medical history of BPH (benign prostatic hypertrophy).   has a past surgical history that includes other surgical history (8/28/15); Appendectomy; Colon surgery; Colonoscopy (2015); Upper gastrointestinal endoscopy (2015); Prostate surgery (10/07/2015); and pr femur/knee surg unlisted (Right, 2018). Restrictions  Restrictions/Precautions  Restrictions/Precautions: Fall Risk, Weight Bearing  Lower Extremity Weight Bearing Restrictions  Right Lower Extremity Weight Bearing: Partial Weight Bearing  Partial Weight Bearing Percentage Or Pounds: 25%  Position Activity Restriction  Other position/activity restrictions: no SLR  Subjective   General  Patient assessed for rehabilitation services?: Yes  Additional Pertinent Hx: 25% WB RLE  Diagnosis: R femur fx., ORIF nail  General Comment  Comments: Pt. willing to get up to recliner. Pain Assessment  Patient Currently in Pain: Yes  Pain Assessment: Faces  Ohara-Baker Pain Rating: Hurts little more  Pain Type: Surgical pain  Pain Location: Hip  Pain Orientation: Right  Pain Descriptors: Aching  Vital Signs  Patient Currently in Pain: Yes   Orientation  Orientation  Overall Orientation Status: Within Normal Limits  Objective    ADL  Feeding: Independent  Grooming: Independent  UE Bathing: Supervision  LE Bathing: Moderate assistance  UE Dressing: Supervision  LE Dressing: Maximum assistance  Toileting: Maximum assistance        Standing Balance  Sit to stand: Minimal assistance     Transfers  Stand Step Transfers: Minimal assistance  Sit to stand: Minimal assistance  Transfer Comments: Rhonda of 2 sit-stand and tranfer over to the walker. Cognition  Overall Cognitive Status: WNL                       LUE AROM (degrees)  LUE AROM : WNL  RUE AROM (degrees)  RUE AROM : WNL                 Assessment   Assessment: Pt. able to take a few steps with therapist assist.  Treatment Diagnosis: R IT femur fx, ORIF  Prognosis: Good  Decision Making: Low Complexity  REQUIRES OT FOLLOW UP: Yes  Activity Tolerance  Activity Tolerance: Patient limited by pain          Plan   Plan  Times per week: 3-5x/week  Times per day: Daily  G-Code  OT G-codes  Functional Assessment Tool Used: ADLs, transfers  Score: CK  Functional Limitation: Self care  Self Care Current Status (): At least 40 percent but less than 60 percent impaired, limited or restricted  Self Care Goal Status (): At least 1 percent but less than 20 percent impaired, limited or restricted  OutComes Score                                           AM-PAC Score             Goals  Short term goals  Time Frame for Short term goals: 1 week  Short term goal 1: CGA with toilet tfers with s.w.   Short term goal 2: CGA with sit-stand to walker  Short term goal 3: Min A with seated LB dsg  Short term goal 4: Rhonda with clothing mgmt in standing  Long term goals  Long term goal 1: Return to PLOF       Therapy Time   Individual Concurrent Group Co-treatment   Time In           Time Out           Minutes                   Sofia Hebert OTR/L

## 2018-09-10 NOTE — CARE COORDINATION
Spoke with patient regarding MD orders for [facility] services. Patient agreeable and has chosen [facility]. Referral Faxed. [facility] [phone]. [phone]. Please notify [facility] when patient discharges and fax DC Summary,  DC med list and any new [facility] orders. I also spoke with Caryle Levin, discharge planner, after speaking to patient. Caryle Levin feels this patient may go to inpatient rehab for a short time before going home. I will follow for discharge.   Electronically signed by Gail Topete RN on 9/10/2018 at 2:12 PM

## 2018-09-10 NOTE — PROGRESS NOTES
Pt unable to urinate despite several attempts in urinal.  Placed Brewer Catheter #16 Fr using aseptic technique with immediate return of cloudy monica urine. Pt reported relief of urinary urgency after catheter placement. Pt tolerated procedure well.

## 2018-09-10 NOTE — PROGRESS NOTES
Physical Therapy  Name: Margaret Call  MRN:  [id number]  Date of service:  9/10/2018       09/10/18 1448   Subjective   Subjective Patient agreeable to work with therapy. General Comment   Comments Charting is for both morning and afternoon tx's. Vital Signs   Temp 98.2 °F (36.8 °C)   Temp Source Temporal   Pulse 87   Heart Rate Source Monitor   Resp 18   /66   BP Location Left Arm   BP Upper/Lower Upper   Oxygen Therapy   SpO2 97 %   O2 Device Nasal cannula   Bed Mobility   Supine to Sit Moderate assistance   Sit to Supine Moderate assistance   Transfers   Sit to Stand Minimal Assistance  (x2)   Stand to sit Minimal Assistance   Bed to Chair Minimal assistance  (x2)   Ambulation   WB Status PWB 25% RLE   Ambulation 1   Surface level tile   Device Standard Walker   Assistance Minimal assistance  (x2)   Quality of Gait did better with 25% WB right LE   Distance 3' to bedside commode and back to bed this morning / 5' to recliner this afternoon   Patient Goals    Patient goals  go home, get stonger   Short term goals   Time Frame for Short term goals 2 wks   Short term goal 1 supine to sit indep   Short term goal 2 sit to stand SBA with SW and PWB 35% RLE. Short term goal 3 bed to chair pivot transfer SBA with SW PWB 25% RLE. Short term goal 4 amb. 22' with SW PWB 25% RLE SBA   Conditions Requiring Skilled Therapeutic Intervention   Body structures, Functions, Activity limitations Decreased functional mobility ; Decreased ROM; Decreased strength;Decreased safe awareness;Decreased balance;Decreased cognition;Decreased endurance   Assessment Assisted patient back to bed this morning and left in recliner with all needs in reach, this afternoon.     Treatment Diagnosis impaired gait and mobility   Prognosis Good       Electronically signed by Buster Hernandez PTA on 9/10/2018 at 5:28 PM

## 2018-09-11 LAB
HCT VFR BLD CALC: 37.4 % (ref 42–52)
HEMOGLOBIN: 12.4 G/DL (ref 14–18)

## 2018-09-11 PROCEDURE — 92610 EVALUATE SWALLOWING FUNCTION: CPT

## 2018-09-11 PROCEDURE — G8996 SWALLOW CURRENT STATUS: HCPCS

## 2018-09-11 PROCEDURE — G8997 SWALLOW GOAL STATUS: HCPCS

## 2018-09-11 PROCEDURE — 51701 INSERT BLADDER CATHETER: CPT

## 2018-09-11 PROCEDURE — 1210000000 HC MED SURG R&B

## 2018-09-11 PROCEDURE — 36415 COLL VENOUS BLD VENIPUNCTURE: CPT

## 2018-09-11 PROCEDURE — 6370000000 HC RX 637 (ALT 250 FOR IP): Performed by: FAMILY MEDICINE

## 2018-09-11 PROCEDURE — 2580000003 HC RX 258: Performed by: ORTHOPAEDIC SURGERY

## 2018-09-11 PROCEDURE — 99233 SBSQ HOSP IP/OBS HIGH 50: CPT | Performed by: FAMILY MEDICINE

## 2018-09-11 PROCEDURE — 97116 GAIT TRAINING THERAPY: CPT

## 2018-09-11 PROCEDURE — 97530 THERAPEUTIC ACTIVITIES: CPT

## 2018-09-11 PROCEDURE — 6370000000 HC RX 637 (ALT 250 FOR IP): Performed by: ORTHOPAEDIC SURGERY

## 2018-09-11 PROCEDURE — 85014 HEMATOCRIT: CPT

## 2018-09-11 PROCEDURE — 85018 HEMOGLOBIN: CPT

## 2018-09-11 RX ORDER — SIMETHICONE 80 MG
80 TABLET,CHEWABLE ORAL EVERY 6 HOURS PRN
Status: DISCONTINUED | OUTPATIENT
Start: 2018-09-11 | End: 2018-09-13 | Stop reason: HOSPADM

## 2018-09-11 RX ADMIN — GUAIFENESIN 600 MG: 600 TABLET, EXTENDED RELEASE ORAL at 19:56

## 2018-09-11 RX ADMIN — Medication 10 ML: at 08:50

## 2018-09-11 RX ADMIN — GUAIFENESIN 600 MG: 600 TABLET, EXTENDED RELEASE ORAL at 08:47

## 2018-09-11 RX ADMIN — TAMSULOSIN HYDROCHLORIDE 0.4 MG: 0.4 CAPSULE ORAL at 08:48

## 2018-09-11 RX ADMIN — RIVAROXABAN 10 MG: 10 TABLET, FILM COATED ORAL at 17:39

## 2018-09-11 RX ADMIN — DOCUSATE SODIUM 100 MG: 100 CAPSULE, LIQUID FILLED ORAL at 19:56

## 2018-09-11 RX ADMIN — DIPHENHYDRAMINE HCL 25 MG: 25 TABLET ORAL at 08:48

## 2018-09-11 RX ADMIN — FLUTICASONE PROPIONATE 2 SPRAY: 50 SPRAY, METERED NASAL at 08:48

## 2018-09-11 RX ADMIN — DOCUSATE SODIUM 100 MG: 100 CAPSULE, LIQUID FILLED ORAL at 08:48

## 2018-09-11 ASSESSMENT — PAIN DESCRIPTION - ORIENTATION: ORIENTATION: RIGHT

## 2018-09-11 ASSESSMENT — PAIN DESCRIPTION - PAIN TYPE: TYPE: SURGICAL PAIN

## 2018-09-11 ASSESSMENT — PAIN SCALES - WONG BAKER: WONGBAKER_NUMERICALRESPONSE: 4

## 2018-09-11 ASSESSMENT — PAIN DESCRIPTION - LOCATION: LOCATION: LEG

## 2018-09-11 NOTE — PROGRESS NOTES
PRN Gabriella Herndon MD        lactated ringers infusion   Intravenous Continuous Gabriella Herndon  mL/hr at 09/09/18 2224      sodium chloride flush 0.9 % injection 10 mL  10 mL Intravenous 2 times per day Gabriella Herndon MD   10 mL at 09/11/18 0850    sodium chloride flush 0.9 % injection 10 mL  10 mL Intravenous PRN Gabriella Herndon MD        acetaminophen (TYLENOL) tablet 650 mg  650 mg Oral Q4H PRN Gabriella Herndon MD   650 mg at 09/10/18 2025    oxyCODONE-acetaminophen (PERCOCET) 5-325 MG per tablet 1 tablet  1 tablet Oral Q4H PRN Gabriella Herndon MD        Or    oxyCODONE-acetaminophen (PERCOCET) 5-325 MG per tablet 2 tablet  2 tablet Oral Q4H PRN Gabriella Herndon MD        morphine injection 2 mg  2 mg Intravenous Q2H PRN Gabriella Herndon MD        Or    morphine injection 4 mg  4 mg Intravenous Q2H PRN Gabriella Herndon MD        docusate sodium (COLACE) capsule 100 mg  100 mg Oral BID Gabriella Herndon MD   100 mg at 09/11/18 0848    magnesium hydroxide (MILK OF MAGNESIA) 400 MG/5ML suspension 30 mL  30 mL Oral Daily PRN Gabriella Herndon MD        bisacodyl (DULCOLAX) suppository 10 mg  10 mg Rectal Daily PRN Gabriella Herndon MD   10 mg at 09/10/18 0456    ondansetron (ZOFRAN) injection 4 mg  4 mg Intravenous Q6H PRN Gabriella Herndon MD        rivaroxaban Salmeron Tomasz) tablet 10 mg  10 mg Oral Daily Gabriella Herndon MD   10 mg at 09/10/18 1748    tamsulosin (FLOMAX) capsule 0.4 mg  0.4 mg Oral Daily Gabriella Herndon MD   0.4 mg at 09/11/18 0848        Labs:     Recent Labs      09/08/18 2258  09/10/18   0615  09/11/18   0610   WBC  9.3   --    --    RBC  4.93   --    --    HGB  17.1  14.8  12.4*   HCT  50.7  43.9  37.4*   MCV  102.8*   --    --    MCH  34.7*   --    --    MCHC  33.7   --    --    PLT  143   --    --      Recent Labs      09/08/18   2258  09/10/18   0615 NA  139  135*   K  4.2  3.5   ANIONGAP  18  13   CL  100  98   CO2  21*  24   BUN  16  17   CREATININE  0.8  0.8   GLUCOSE  149*  199*   CALCIUM  8.9  8.3     No results for input(s): MG, PHOS in the last 72 hours. No results for input(s): AST, ALT, ALB, BILITOT, ALKPHOS, ALB in the last 72 hours. ABGs:No results for input(s): PHART, FOL6SQI, PO2ART, DYT6XEF, BEART, HGBAE, M3GFYATP, CARBOXHGBART, 02THERAPY in the last 72 hours. HgBA1c: Invalid input(s): HGBA1C  FLP:  No results found for: TRIG, HDL, LDLCALC, LDLDIRECT, LABVLDL  TSH:  No results found for: TSH  Troponin T:   Recent Labs      09/08/18   2255  09/10/18   0615   TROPONINI  0.04*  0.02     INR:   Recent Labs      09/08/18 2258   INR  1.01       Objective:   Vitals: /68   Pulse 89   Temp 97.8 °F (36.6 °C) (Temporal)   Resp 16   Ht 5' 10\" (1.778 m)   Wt 147 lb (66.7 kg)   SpO2 94%   BMI 21.09 kg/m²   24HR INTAKE/OUTPUT:      Intake/Output Summary (Last 24 hours) at 09/11/18 0913  Last data filed at 09/11/18 0854   Gross per 24 hour   Intake             1110 ml   Output              610 ml   Net              500 ml     General appearance: alert and cooperative with exam  HEENT: atraumatic, eyes with clear conjunctiva and normal lids, pupils and irises normal, external ears and nose are normal, lips normal.  Neck without masses, lympadenopathy, bruit, thyroid normal  Lungs: no increased work of breathing, \"clear to auscultation bilaterally\" without rales, rhonchi or wheezes  Heart: regular rate and rhythm, S1, S2 normal, no murmur, click, rub or gallop  Abdomen: soft, non-tender; bowel sounds normal; no masses,  no organomegaly  Extremities: extremities normal, atraumatic, no cyanosis or edema  Neurologic: No focal neurologic deficits, normal sensation, alert and oriented, affect and mood appropriate. Skin: no rashes, nodules. Assessment and Plan:    Active Problems:    Closed right hip fracture, initial encounter ([facility] [address]

## 2018-09-11 NOTE — PROGRESS NOTES
Physical Therapy  Rodríguez Connors  [id number]     09/11/18 1138   Subjective   Subjective Pt states he is ready to try and get up but it hurts. Bed Mobility   Supine to Sit Moderate assistance   Scooting Moderate assistance   Transfers   Sit to Stand Minimal Assistance  (x2)   Stand to sit Minimal Assistance   Bed to Chair Minimal assistance  (x2)   Ambulation   Ambulation? Yes   WB Status PWB 25% RLE   Ambulation 1   Surface level tile   Device Standard Walker   Assistance Minimal assistance  (x2)   Distance 2'   Short term goals   Time Frame for Short term goals 2 wks   Short term goal 1 supine to sit indep   Short term goal 2 sit to stand SBA with SW and PWB 35% RLE. Short term goal 3 bed to chair pivot transfer SBA with SW PWB 25% RLE. Short term goal 4 amb. 22' with SW PWB 25% RLE SBA   Conditions Requiring Skilled Therapeutic Intervention   Body structures, Functions, Activity limitations Decreased functional mobility ; Decreased ADL status; Decreased ROM; Decreased endurance;Decreased balance   Assessment Pt did well with transfers. Activity Tolerance   Activity Tolerance Patient Tolerated treatment well;Patient limited by pain; Patient limited by endurance   Electronically signed by Ally Joshi PTA on 9/11/2018 at 11:47 AM

## 2018-09-11 NOTE — PROGRESS NOTES
consult. Treatment Plan  Requires SLP Intervention: Yes    Recommended Diet and Intervention  Diet Solids Recommendation: Dysphagia II Mechanically Altered  Liquid Consistency Recommendation: Nectar  Recommended Form of Meds: Meds in puree  Therapeutic Interventions: Patient/Family education;Diet tolerance monitoring; Therapeutic PO trials with SLP    Compensatory Swallowing Strategies  Compensatory Swallowing Strategies: Upright as possible for all oral intake;Small bites/sips;Eat/Feed slowly; Alternate solids and liquids; External pacing;Remain upright for 30-45 minutes after meals    Treatment/Goals  Timeframe for Short-term Goals: 1x/day for 3 days  Goal 1: Patient will tolerate mechanical soft consistency and nectar thick liquids with min S/S penetration/aspiration during PO intake. Goal 2: Patient staff will follow swallow safety recommendations to decrease risk of penetration/aspiration during PO intake. Goal 3: Re-assessment of swallow function for potential diet upgrade. General  Chart Reviewed: Yes  Behavior/Cognition: Alert; Cooperative;Pleasant mood; Requires cueing  O2 Device: None (Room air)  Communication Observation:  (SLP ranked functional intelligibility of speech for unfamiliar listeners at 100% in verbalizations.)  Follows Directions: Simple  Dentition: Poor dental/oral hygiene  Patient Positioning: Upright in bed  Volitional Cough: Weak  Volitional Swallow: Delayed  Consistencies Administered: Ice Chips;Puree;Mechanical soft; Thin - cup;Nectar - cup    Assessed patient's swallowing function with the following observations noted:    Oral Phase Dysfunction  Oral Phase: Exceptions  Oral Phase Dysfunction  Impaired Mastication: Ice chips;Mechanical soft (Patient exhibited slow oral prep of single ice chip trials and mechanical soft consistency trials (with added gravy texture) presented by SLP.)  Suspected Premature Bolus Loss:  Thin - cup (Patient exhibited fast oral transit of thin H2O trials

## 2018-09-11 NOTE — PROGRESS NOTES
Physical Therapy  Mirella Bey  [id number]     09/11/18 1528   General   Missed reason Patient declined   General Comment   Comments Pt back to bed with nursing   Electronically signed by Josy Turnre PTA on 9/11/2018 at 3:29 PM

## 2018-09-11 NOTE — CARE COORDINATION
Notified Resident at 0651 AM regarding critical results read back.      Spoke with: PICU Resident    Orders were not obtained.    Comments: Updated resident on Blood culture type being Staph. Epidermidis with the Mec A gene per lab report.  No orders received.         Pt has been accepted at [facility]. Pt is able DC to the facility once medically cleared.    [facility]   J   F  Electronically signed by Isela Uptonelor on 9/11/2018 at 8:56 AM

## 2018-09-12 LAB
HCT VFR BLD CALC: 34.9 % (ref 42–52)
HEMOGLOBIN: 11.7 G/DL (ref 14–18)

## 2018-09-12 PROCEDURE — 97116 GAIT TRAINING THERAPY: CPT

## 2018-09-12 PROCEDURE — 85018 HEMOGLOBIN: CPT

## 2018-09-12 PROCEDURE — 36415 COLL VENOUS BLD VENIPUNCTURE: CPT

## 2018-09-12 PROCEDURE — 2580000003 HC RX 258: Performed by: ORTHOPAEDIC SURGERY

## 2018-09-12 PROCEDURE — 97535 SELF CARE MNGMENT TRAINING: CPT

## 2018-09-12 PROCEDURE — 99232 SBSQ HOSP IP/OBS MODERATE 35: CPT | Performed by: INTERNAL MEDICINE

## 2018-09-12 PROCEDURE — 92526 ORAL FUNCTION THERAPY: CPT

## 2018-09-12 PROCEDURE — 85014 HEMATOCRIT: CPT

## 2018-09-12 PROCEDURE — 97530 THERAPEUTIC ACTIVITIES: CPT

## 2018-09-12 PROCEDURE — 1210000000 HC MED SURG R&B

## 2018-09-12 PROCEDURE — 6370000000 HC RX 637 (ALT 250 FOR IP): Performed by: ORTHOPAEDIC SURGERY

## 2018-09-12 PROCEDURE — 2700000000 HC OXYGEN THERAPY PER DAY

## 2018-09-12 PROCEDURE — 6370000000 HC RX 637 (ALT 250 FOR IP): Performed by: FAMILY MEDICINE

## 2018-09-12 RX ADMIN — TAMSULOSIN HYDROCHLORIDE 0.4 MG: 0.4 CAPSULE ORAL at 08:59

## 2018-09-12 RX ADMIN — FLUTICASONE PROPIONATE 2 SPRAY: 50 SPRAY, METERED NASAL at 09:01

## 2018-09-12 RX ADMIN — GUAIFENESIN 600 MG: 600 TABLET, EXTENDED RELEASE ORAL at 21:17

## 2018-09-12 RX ADMIN — GUAIFENESIN 600 MG: 600 TABLET, EXTENDED RELEASE ORAL at 08:59

## 2018-09-12 RX ADMIN — RIVAROXABAN 10 MG: 10 TABLET, FILM COATED ORAL at 17:09

## 2018-09-12 RX ADMIN — Medication 10 ML: at 21:18

## 2018-09-12 RX ADMIN — DOCUSATE SODIUM 100 MG: 100 CAPSULE, LIQUID FILLED ORAL at 08:59

## 2018-09-12 RX ADMIN — DOCUSATE SODIUM 100 MG: 100 CAPSULE, LIQUID FILLED ORAL at 21:17

## 2018-09-12 RX ADMIN — DIPHENHYDRAMINE HCL 25 MG: 25 TABLET ORAL at 08:59

## 2018-09-12 ASSESSMENT — PAIN DESCRIPTION - PAIN TYPE: TYPE: SURGICAL PAIN

## 2018-09-12 ASSESSMENT — PAIN SCALES - WONG BAKER: WONGBAKER_NUMERICALRESPONSE: 4

## 2018-09-12 ASSESSMENT — PAIN SCALES - GENERAL: PAINLEVEL_OUTOF10: 4

## 2018-09-12 NOTE — PROGRESS NOTES
Waqar Blue is a 80 y.o. male patient.     Current Facility-Administered Medications   Medication Dose Route Frequency Provider Last Rate Last Dose    simethicone (MYLICON) chewable tablet 80 mg  80 mg Oral Q6H PRN Becki Negrete DO        guaiFENesin [facility]) extended release tablet 600 mg  600 mg Oral BID [facility], DO   600 mg at 09/12/18 0859    benzonatate (TESSALON) capsule 100 mg  100 mg Oral TID PRN Becki Negrete DO        diphenhydrAMINE (BENADRYL) tablet 25 mg  25 mg Oral Daily Rodney Troncoso MD   25 mg at 09/12/18 0859    fluticasone (FLONASE) 50 MCG/ACT nasal spray 2 spray  2 spray Nasal Daily Rodney Troncoso MD   2 spray at 09/12/18 0901    0.9 % sodium chloride bolus  500 mL Intravenous PRN Rodney Troncoso MD        lactated ringers infusion   Intravenous Continuous Rodney Troncoso  mL/hr at 09/09/18 2224      sodium chloride flush 0.9 % injection 10 mL  10 mL Intravenous 2 times per day Rodney Troncoso MD   10 mL at 09/11/18 0850    sodium chloride flush 0.9 % injection 10 mL  10 mL Intravenous PRN Rodney Troncoso MD        acetaminophen (TYLENOL) tablet 650 mg  650 mg Oral Q4H PRN Rodney Troncoso MD   650 mg at 09/10/18 2025    oxyCODONE-acetaminophen (PERCOCET) 5-325 MG per tablet 1 tablet  1 tablet Oral Q4H PRN Rodney Troncoso MD        Or    oxyCODONE-acetaminophen (PERCOCET) 5-325 MG per tablet 2 tablet  2 tablet Oral Q4H PRN Rodney Troncoso MD        morphine injection 2 mg  2 mg Intravenous Q2H PRN Rodney Troncoso MD        Or    morphine injection 4 mg  4 mg Intravenous Q2H PRN Rodney Troncoso MD        docusate sodium (COLACE) capsule 100 mg  100 mg Oral BID Rodney Troncoso MD   100 mg at 09/12/18 0859    magnesium hydroxide (MILK OF MAGNESIA) 400 MG/5ML suspension 30 mL  30 mL Oral Daily PRN Rodney Troncoso MD        bisacodyl (DULCOLAX) suppository 10 mg

## 2018-09-12 NOTE — PROGRESS NOTES
Speech Language Pathology  Facility/Department: [facility] SURG SERVICES  SWALLOW THERAPY    NAME: Msaon Blancas  : 6/15/1925  MRN: [id number]    ADMISSION DATE: 2018  ADMITTING DIAGNOSIS: has History of colonic polyps; Encounter for screening colonoscopy; S/P partial resection of colon; Closed right hip fracture, initial encounter ([facility] [address] Hyponatremia; Microscopic hematuria; Fall; Alcohol use; and Laceration of arm, right, multiple sites on his problem list.    Date of Treat: 2018  Evaluating Therapist: Mesha Owens    Current Diet level:  Current Diet : [facility] soft  Current Liquid Diet : Nectar    Reason for Referral  Mason Blancas was referred for a bedside swallow evaluation to assess the efficiency of his swallow function, identify signs and symptoms of aspiration and make recommendations regarding safe dietary consistencies, effective compensatory strategies, and safe eating environment. Impression  Observed patient's swallowing function. Patient exhibits slow oral prep of more solid consistencies, fast oral transit and suspected swallow delay with thin liquids, and inconsistently sluggish laryngeal elevation for swallow airway protection. Even so, no outward S/S penetration/aspiration was noted with any mechanical soft consistency presentation or nectar thick liquid presentation administered during treatment session. With thin H2O trials, while no outward coughs/throat clears were observed, patient did report sensation of filling up at chest level post swallows. Still question esophageal dysfunction. At this time, still recommend mechanical soft consistency. Nectar thick liquids. Meds whole in pudding or applesauce. If patient receives mouth care prior to intake, still okay for thin H2O IN BETWEEN MEALS for comfort.      Treatment Plan  Requires SLP Intervention: Yes    Recommended Diet and Intervention  Diet Solids Recommendation: Dysphagia II Mechanically Altered  Liquid Consistency

## 2018-09-13 VITALS
SYSTOLIC BLOOD PRESSURE: 114 MMHG | HEIGHT: 70 IN | RESPIRATION RATE: 18 BRPM | BODY MASS INDEX: 21.05 KG/M2 | HEART RATE: 81 BPM | OXYGEN SATURATION: 100 % | TEMPERATURE: 98.8 F | DIASTOLIC BLOOD PRESSURE: 72 MMHG | WEIGHT: 147 LBS

## 2018-09-13 LAB
ANION GAP SERPL CALCULATED.3IONS-SCNC: 9 MMOL/L (ref 7–19)
BASOPHILS ABSOLUTE: 0 K/UL (ref 0–0.2)
BASOPHILS RELATIVE PERCENT: 0.3 % (ref 0–1)
BUN BLDV-MCNC: 17 MG/DL (ref 8–23)
CALCIUM SERPL-MCNC: 8.1 MG/DL (ref 8.2–9.6)
CHLORIDE BLD-SCNC: 104 MMOL/L (ref 98–111)
CO2: 27 MMOL/L (ref 22–29)
CREAT SERPL-MCNC: 0.7 MG/DL (ref 0.5–1.2)
EOSINOPHILS ABSOLUTE: 0 K/UL (ref 0–0.6)
EOSINOPHILS RELATIVE PERCENT: 0.4 % (ref 0–5)
GFR NON-AFRICAN AMERICAN: >60
GLUCOSE BLD-MCNC: 90 MG/DL (ref 74–109)
HCT VFR BLD CALC: 35.7 % (ref 42–52)
HEMOGLOBIN: 11.9 G/DL (ref 14–18)
LYMPHOCYTES ABSOLUTE: 2.4 K/UL (ref 1.1–4.5)
LYMPHOCYTES RELATIVE PERCENT: 31.7 % (ref 20–40)
MCH RBC QN AUTO: 34.3 PG (ref 27–31)
MCHC RBC AUTO-ENTMCNC: 33.3 G/DL (ref 33–37)
MCV RBC AUTO: 102.9 FL (ref 80–94)
MONOCYTES ABSOLUTE: 0.9 K/UL (ref 0–0.9)
MONOCYTES RELATIVE PERCENT: 11.5 % (ref 0–10)
NEUTROPHILS ABSOLUTE: 4.1 K/UL (ref 1.5–7.5)
NEUTROPHILS RELATIVE PERCENT: 55.2 % (ref 50–65)
PDW BLD-RTO: 13 % (ref 11.5–14.5)
PLATELET # BLD: 141 K/UL (ref 130–400)
PMV BLD AUTO: 10.4 FL (ref 9.4–12.4)
POTASSIUM SERPL-SCNC: 3.5 MMOL/L (ref 3.5–5)
RBC # BLD: 3.47 M/UL (ref 4.7–6.1)
SODIUM BLD-SCNC: 140 MMOL/L (ref 136–145)
WBC # BLD: 7.5 K/UL (ref 4.8–10.8)

## 2018-09-13 PROCEDURE — 80048 BASIC METABOLIC PNL TOTAL CA: CPT

## 2018-09-13 PROCEDURE — 97530 THERAPEUTIC ACTIVITIES: CPT

## 2018-09-13 PROCEDURE — 6370000000 HC RX 637 (ALT 250 FOR IP): Performed by: ORTHOPAEDIC SURGERY

## 2018-09-13 PROCEDURE — 97116 GAIT TRAINING THERAPY: CPT

## 2018-09-13 PROCEDURE — 36415 COLL VENOUS BLD VENIPUNCTURE: CPT

## 2018-09-13 PROCEDURE — 85025 COMPLETE CBC W/AUTO DIFF WBC: CPT

## 2018-09-13 PROCEDURE — 92526 ORAL FUNCTION THERAPY: CPT

## 2018-09-13 PROCEDURE — 6370000000 HC RX 637 (ALT 250 FOR IP): Performed by: FAMILY MEDICINE

## 2018-09-13 PROCEDURE — 97535 SELF CARE MNGMENT TRAINING: CPT

## 2018-09-13 PROCEDURE — 99238 HOSP IP/OBS DSCHRG MGMT 30/<: CPT | Performed by: INTERNAL MEDICINE

## 2018-09-13 RX ORDER — ACETAMINOPHEN 325 MG/1
650 TABLET ORAL EVERY 4 HOURS PRN
Qty: 120 TABLET | Refills: 3
Start: 2018-09-13 | End: 2020-09-18

## 2018-09-13 RX ORDER — LANOLIN ALCOHOL/MO/W.PET/CERES
325 CREAM (GRAM) TOPICAL 2 TIMES DAILY
Qty: 90 TABLET | Refills: 0 | Status: SHIPPED | OUTPATIENT
Start: 2018-09-13 | End: 2019-06-07

## 2018-09-13 RX ADMIN — DOCUSATE SODIUM 100 MG: 100 CAPSULE, LIQUID FILLED ORAL at 09:16

## 2018-09-13 RX ADMIN — TAMSULOSIN HYDROCHLORIDE 0.4 MG: 0.4 CAPSULE ORAL at 09:16

## 2018-09-13 RX ADMIN — DIPHENHYDRAMINE HCL 25 MG: 25 TABLET ORAL at 09:16

## 2018-09-13 RX ADMIN — BISACODYL 10 MG: 10 SUPPOSITORY RECTAL at 07:35

## 2018-09-13 RX ADMIN — GUAIFENESIN 600 MG: 600 TABLET, EXTENDED RELEASE ORAL at 09:16

## 2018-09-13 NOTE — PROGRESS NOTES
Physical Therapy  Name: Gretta Phillips  MRN:  [id number]  Date of service:  9/13/2018 09/13/18 1200   Subjective   Subjective Patient agreeable to work with therapy. General Comment   Comments Charting is for 2 visits. Bed Mobility   Supine to Sit Moderate assistance   Scooting Moderate assistance;Minimal assistance   Transfers   Sit to Stand Minimal Assistance; Moderate Assistance   Stand to sit Minimal Assistance; Moderate Assistance   Bed to Chair Minimal assistance  (1+1)   Ambulation   WB Status PWB 25% RLE   Ambulation 1   Surface level tile   Device Standard Walker   Assistance Minimal assistance  (1+1)   Quality of Gait did better with 25% WB right LE   Distance 6' to recliner / 5' to car for transport to SNF    Other Activities   Comment Assisted patient with getting dressed for discharge, assisted patient into the car. Short term goals   Time Frame for Short term goals 2 wks   Short term goal 1 supine to sit indep   Short term goal 2 sit to stand SBA with SW and PWB 35% RLE. Short term goal 3 bed to chair pivot transfer SBA with SW PWB 25% RLE. Short term goal 4 amb. 22' with SW PWB 25% RLE SBA   Conditions Requiring Skilled Therapeutic Intervention   Body structures, Functions, Activity limitations Decreased functional mobility ; Decreased ADL status; Decreased ROM; Decreased endurance;Decreased balance   Assessment Patient doing well, lots of reminders for 25% wb'ing. Patient discharged to SNF.    Treatment Diagnosis impaired gait and mobility   Prognosis Good       Electronically signed by Sachin Aguirre PTA on 9/13/2018 at 5:32 PM

## 2018-09-13 NOTE — DISCHARGE SUMMARY
Physician Discharge Summary     Patient ID:  Steve Mathew  [id number]  60 y.o.  6/15/1925    Admit date: 9/8/2018    Discharge date and time: 9/13/2018   1100    Admitting Physician: Cherrie Rodarte M.D. Discharge Physician: Adriane Villarreal M.D. Admission Diagnoses: Closed right hip fracture    Discharge Diagnoses: Closed right hip fracture status post ORIF, dysphagia, acute blood loss anemia which is stable    Admission Condition: fair    Discharged Condition: good    Indication for Admission: Right hip fracture    Hospital Course: The patient is a 29-year-old male who presented after a fall with complaint of right hip pain and was found to have a right hip fracture. Please see admitting history and physical for details. Hospital course: Patient was seen in consult by orthopedics. He underwent ORIF of the fracture. Postoperatively the patient has done well. He does have acute blood loss anemia which has stabilized. Arrangements have been made for the patient to be discharged to SNF for further rehab. Consults: orthopedic surgery    Significant Diagnostic Studies: X-rays    Treatments: surgery: ORIF of right hip fracture    Discharge Exam:    General: in no distress  Pulmonary: Lungs clear, unlabored breathing  Cardiovascular: Heart regular without murmur, no peripheral edema  Gastrointestinal: Abdomen soft, nontender, no guarding or masses  Neurologic: Alert, no focal motor deficits  Skin: Warm and dry. No rash.         Disposition: SNF    In process/preliminary results:  Outstanding Order Results     Date and Time Order Name Status Description    9/9/2018 0957 FLUORO FOR SURGICAL PROCEDURES In process           Patient Instructions:   Current Discharge Medication List      START taking these medications    Details   acetaminophen (TYLENOL) 325 MG tablet Take 2 tablets by mouth every 4 hours as needed for Pain  Qty: 120 tablet, Refills: 3      rivaroxaban (XARELTO) 10 MG TABS tablet Take 1 tablet by mouth

## 2018-09-13 NOTE — PROGRESS NOTES
Occupational Therapy  Facility/Department: [facility] 5 SURG SERVICES  Daily Treatment Note  NAME: Fidencio Bhardwaj  : 6/15/1925  MRN: [id number]    Date of Service: 2018    Discharge Recommendations:  Patient would benefit from continued therapy after discharge       Patient Diagnosis(es): The encounter diagnosis was Closed fracture of right hip, initial encounter ([facility] [address] has a past medical history of BPH (benign prostatic hypertrophy).   has a past surgical history that includes other surgical history (8/28/15); Appendectomy; Colon surgery; Colonoscopy (2015); Upper gastrointestinal endoscopy (2015); Prostate surgery (10/07/2015); and pr femur/knee surg unlisted (Right, 2018). Restrictions  Restrictions/Precautions  Restrictions/Precautions: Fall Risk, Weight Bearing  Lower Extremity Weight Bearing Restrictions  Right Lower Extremity Weight Bearing: Partial Weight Bearing  Partial Weight Bearing Percentage Or Pounds: 25%  Position Activity Restriction  Other position/activity restrictions: no SLR  Subjective   General  Chart Reviewed: Yes  Patient assessed for rehabilitation services?: Yes  Additional Pertinent Hx: 25% WB RLE  Diagnosis: R femur fx., ORIF nail  General Comment  Comments: Pt. ready to get dressed to leave. Orientation     Objective    ADL  LE Dressing: Maximum assistance  Toileting: Minimal assistance  Additional Comments: Pt. with Max A to don pants over RLE, Rhonda to don pants over LLE. Rhonda for standing balance and Rhonda with pulling pants up over hips with standing. Stood with Rhonda to use urinal .        Standing Balance  Sit to stand: Minimal assistance     Transfers  Stand Step Transfers: Minimal assistance  Sit to stand: Minimal assistance                                                                 Assessment   Assessment: Daughter observed therapist working with Pt. on LLE dsg.              Plan   Plan  Times per week: 3-5x/week  Times per day: Daily  Current

## 2018-09-13 NOTE — PROGRESS NOTES
Billy Das is a 80 y.o. male patient.     Current Facility-Administered Medications   Medication Dose Route Frequency Provider Last Rate Last Dose    simethicone (MYLICON) chewable tablet 80 mg  80 mg Oral Q6H PRN Goran Crowder DO        [facility]in [facility]) extended release tablet 600 mg  600 mg Oral BID [facility], DO   600 mg at 09/13/18 7214    benzonatate (TESSALON) capsule 100 mg  100 mg Oral TID PRN Goran Crowder DO        diphenhydrAMINE (BENADRYL) tablet 25 mg  25 mg Oral Daily Sylwia Borrego MD   25 mg at 09/13/18 0916    fluticasone (FLONASE) 50 MCG/ACT nasal spray 2 spray  2 spray Nasal Daily Sylwia Borrego MD   2 spray at 09/12/18 0901    0.9 % sodium chloride bolus  500 mL Intravenous PRN Sylwia Borrego MD        sodium chloride flush 0.9 % injection 10 mL  10 mL Intravenous 2 times per day Sylwia Borrego MD   10 mL at 09/12/18 2118    sodium chloride flush 0.9 % injection 10 mL  10 mL Intravenous PRN Sylwia Borrego MD        acetaminophen (TYLENOL) tablet 650 mg  650 mg Oral Q4H PRN Sylwia Borrego MD   650 mg at 09/10/18 2025    oxyCODONE-acetaminophen (PERCOCET) 5-325 MG per tablet 1 tablet  1 tablet Oral Q4H PRN Sylwia Borrego MD        Or    oxyCODONE-acetaminophen (PERCOCET) 5-325 MG per tablet 2 tablet  2 tablet Oral Q4H PRN Sylwia Borrego MD        morphine injection 2 mg  2 mg Intravenous Q2H PRN Sylwia Borrego MD        Or    morphine injection 4 mg  4 mg Intravenous Q2H PRN Sylwia Borrego MD        docusate sodium (COLACE) capsule 100 mg  100 mg Oral BID Sylwia Borrego MD   100 mg at 09/13/18 0916    magnesium hydroxide (MILK OF MAGNESIA) 400 MG/5ML suspension 30 mL  30 mL Oral Daily PRN Sylwia Borrego MD        bisacodyl (DULCOLAX) suppository 10 mg  10 mg Rectal Daily PRN Sylwia Borrego MD   10 mg at 09/13/18 0735    [facility]) injection 4

## 2018-09-14 LAB
EKG P AXIS: 53 DEGREES
EKG P-R INTERVAL: 164 MS
EKG Q-T INTERVAL: 398 MS
EKG QRS DURATION: 136 MS
EKG QTC CALCULATION (BAZETT): 431 MS
EKG T AXIS: 45 DEGREES

## 2018-09-14 NOTE — CARE COORDINATION
DC Summary and Med list have been faxed to [facility]. SN has notified Elavie at [facility], that patient has referral to [facility] and to please call [facility] once patient has discharged home from rehab so that patient can be admitted to home health service. My name and call back number left for questions.   Electronically signed by Alem Hernandez RN on 9/14/2018 at 11:38 AM

## 2018-09-17 ENCOUNTER — LAB REQUISITION (OUTPATIENT)
Dept: LAB | Facility: HOSPITAL | Age: 83
End: 2018-09-17

## 2018-09-17 DIAGNOSIS — Z00.00 ENCOUNTER FOR GENERAL ADULT MEDICAL EXAMINATION WITHOUT ABNORMAL FINDINGS: ICD-10-CM

## 2018-09-17 LAB
ALBUMIN SERPL-MCNC: 2.5 G/DL (ref 3.5–5)
ALBUMIN/GLOB SERPL: 1 G/DL (ref 1.1–2.5)
ALP SERPL-CCNC: 61 U/L (ref 24–120)
ALT SERPL W P-5'-P-CCNC: 39 U/L (ref 0–54)
ANION GAP SERPL CALCULATED.3IONS-SCNC: 8 MMOL/L (ref 4–13)
AST SERPL-CCNC: 39 U/L (ref 7–45)
BASOPHILS # BLD AUTO: 0.04 10*3/MM3 (ref 0–0.2)
BASOPHILS NFR BLD AUTO: 0.7 % (ref 0–2)
BILIRUB SERPL-MCNC: 2.1 MG/DL (ref 0.1–1)
BUN BLD-MCNC: 22 MG/DL (ref 5–21)
BUN/CREAT SERPL: 30.1 (ref 7–25)
CALCIUM SPEC-SCNC: 8.1 MG/DL (ref 8.4–10.4)
CHLORIDE SERPL-SCNC: 104 MMOL/L (ref 98–110)
CO2 SERPL-SCNC: 26 MMOL/L (ref 24–31)
CREAT BLD-MCNC: 0.73 MG/DL (ref 0.5–1.4)
DEPRECATED RDW RBC AUTO: 47.8 FL (ref 40–54)
EOSINOPHIL # BLD AUTO: 0.19 10*3/MM3 (ref 0–0.7)
EOSINOPHIL NFR BLD AUTO: 3.4 % (ref 0–4)
ERYTHROCYTE [DISTWIDTH] IN BLOOD BY AUTOMATED COUNT: 13 % (ref 12–15)
GFR SERPL CREATININE-BSD FRML MDRD: 100 ML/MIN/1.73
GLOBULIN UR ELPH-MCNC: 2.5 GM/DL
GLUCOSE BLD-MCNC: 86 MG/DL (ref 70–100)
HCT VFR BLD AUTO: 33.6 % (ref 40–52)
HGB BLD-MCNC: 11.5 G/DL (ref 14–18)
IMM GRANULOCYTES # BLD: 0.03 10*3/MM3 (ref 0–0.03)
IMM GRANULOCYTES NFR BLD: 0.5 % (ref 0–5)
LYMPHOCYTES # BLD AUTO: 1.96 10*3/MM3 (ref 0.72–4.86)
LYMPHOCYTES NFR BLD AUTO: 35.1 % (ref 15–45)
MCH RBC QN AUTO: 34.4 PG (ref 28–32)
MCHC RBC AUTO-ENTMCNC: 34.2 G/DL (ref 33–36)
MCV RBC AUTO: 100.6 FL (ref 82–95)
MONOCYTES # BLD AUTO: 0.64 10*3/MM3 (ref 0.19–1.3)
MONOCYTES NFR BLD AUTO: 11.5 % (ref 4–12)
NEUTROPHILS # BLD AUTO: 2.72 10*3/MM3 (ref 1.87–8.4)
NEUTROPHILS NFR BLD AUTO: 48.8 % (ref 39–78)
NRBC BLD MANUAL-RTO: 0 /100 WBC (ref 0–0)
PLATELET # BLD AUTO: 232 10*3/MM3 (ref 130–400)
PMV BLD AUTO: 10.4 FL (ref 6–12)
POTASSIUM BLD-SCNC: 3.1 MMOL/L (ref 3.5–5.3)
PROT SERPL-MCNC: 5 G/DL (ref 6.3–8.7)
RBC # BLD AUTO: 3.34 10*6/MM3 (ref 4.8–5.9)
SODIUM BLD-SCNC: 138 MMOL/L (ref 135–145)
WBC NRBC COR # BLD: 5.58 10*3/MM3 (ref 4.8–10.8)

## 2018-09-17 PROCEDURE — 80053 COMPREHEN METABOLIC PANEL: CPT | Performed by: NURSE PRACTITIONER

## 2018-09-17 PROCEDURE — 85025 COMPLETE CBC W/AUTO DIFF WBC: CPT | Performed by: NURSE PRACTITIONER

## 2018-09-17 PROCEDURE — 36415 COLL VENOUS BLD VENIPUNCTURE: CPT | Performed by: NURSE PRACTITIONER

## 2018-09-19 ENCOUNTER — LAB REQUISITION (OUTPATIENT)
Dept: LAB | Facility: HOSPITAL | Age: 83
End: 2018-09-19

## 2018-09-19 DIAGNOSIS — Z00.00 ENCOUNTER FOR GENERAL ADULT MEDICAL EXAMINATION WITHOUT ABNORMAL FINDINGS: ICD-10-CM

## 2018-09-19 LAB
ANION GAP SERPL CALCULATED.3IONS-SCNC: 10 MMOL/L (ref 4–13)
BUN BLD-MCNC: 24 MG/DL (ref 5–21)
BUN/CREAT SERPL: 32.9 (ref 7–25)
CALCIUM SPEC-SCNC: 8.4 MG/DL (ref 8.4–10.4)
CHLORIDE SERPL-SCNC: 107 MMOL/L (ref 98–110)
CO2 SERPL-SCNC: 23 MMOL/L (ref 24–31)
CREAT BLD-MCNC: 0.73 MG/DL (ref 0.5–1.4)
GFR SERPL CREATININE-BSD FRML MDRD: 100 ML/MIN/1.73
GLUCOSE BLD-MCNC: 79 MG/DL (ref 70–100)
POTASSIUM BLD-SCNC: 4.1 MMOL/L (ref 3.5–5.3)
SODIUM BLD-SCNC: 140 MMOL/L (ref 135–145)

## 2018-09-19 PROCEDURE — 80048 BASIC METABOLIC PNL TOTAL CA: CPT | Performed by: NURSE PRACTITIONER

## 2018-09-19 PROCEDURE — 36415 COLL VENOUS BLD VENIPUNCTURE: CPT | Performed by: NURSE PRACTITIONER
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
## 2018-10-12 ENCOUNTER — LAB REQUISITION (OUTPATIENT)
Dept: LAB | Facility: HOSPITAL | Age: 83
End: 2018-10-12

## 2018-10-12 DIAGNOSIS — Z00.00 ENCOUNTER FOR GENERAL ADULT MEDICAL EXAMINATION WITHOUT ABNORMAL FINDINGS: ICD-10-CM

## 2018-10-12 LAB
ALBUMIN SERPL-MCNC: 3.1 G/DL (ref 3.5–5)
ALBUMIN/GLOB SERPL: 1.1 G/DL (ref 1.1–2.5)
ALP SERPL-CCNC: 150 U/L (ref 24–120)
ALT SERPL W P-5'-P-CCNC: 26 U/L (ref 0–54)
ANION GAP SERPL CALCULATED.3IONS-SCNC: 7 MMOL/L (ref 4–13)
AST SERPL-CCNC: 26 U/L (ref 7–45)
BASOPHILS # BLD AUTO: 0.05 10*3/MM3 (ref 0–0.2)
BASOPHILS NFR BLD AUTO: 0.8 % (ref 0–2)
BILIRUB SERPL-MCNC: 0.9 MG/DL (ref 0.1–1)
BUN BLD-MCNC: 22 MG/DL (ref 5–21)
BUN/CREAT SERPL: 25.9 (ref 7–25)
CALCIUM SPEC-SCNC: 9.2 MG/DL (ref 8.4–10.4)
CHLORIDE SERPL-SCNC: 104 MMOL/L (ref 98–110)
CO2 SERPL-SCNC: 28 MMOL/L (ref 24–31)
CREAT BLD-MCNC: 0.85 MG/DL (ref 0.5–1.4)
DEPRECATED RDW RBC AUTO: 45.3 FL (ref 40–54)
EOSINOPHIL # BLD AUTO: 0.29 10*3/MM3 (ref 0–0.7)
EOSINOPHIL NFR BLD AUTO: 4.6 % (ref 0–4)
ERYTHROCYTE [DISTWIDTH] IN BLOOD BY AUTOMATED COUNT: 12.1 % (ref 12–15)
GFR SERPL CREATININE-BSD FRML MDRD: 84 ML/MIN/1.73
GLOBULIN UR ELPH-MCNC: 2.7 GM/DL
GLUCOSE BLD-MCNC: 78 MG/DL (ref 70–100)
HCT VFR BLD AUTO: 39.5 % (ref 40–52)
HGB BLD-MCNC: 13.4 G/DL (ref 14–18)
IMM GRANULOCYTES # BLD: 0.02 10*3/MM3 (ref 0–0.03)
IMM GRANULOCYTES NFR BLD: 0.3 % (ref 0–5)
LYMPHOCYTES # BLD AUTO: 2.74 10*3/MM3 (ref 0.72–4.86)
LYMPHOCYTES NFR BLD AUTO: 43.6 % (ref 15–45)
MCH RBC QN AUTO: 34.1 PG (ref 28–32)
MCHC RBC AUTO-ENTMCNC: 33.9 G/DL (ref 33–36)
MCV RBC AUTO: 100.5 FL (ref 82–95)
MONOCYTES # BLD AUTO: 0.51 10*3/MM3 (ref 0.19–1.3)
MONOCYTES NFR BLD AUTO: 8.1 % (ref 4–12)
NEUTROPHILS # BLD AUTO: 2.67 10*3/MM3 (ref 1.87–8.4)
NEUTROPHILS NFR BLD AUTO: 42.6 % (ref 39–78)
NRBC BLD MANUAL-RTO: 0 /100 WBC (ref 0–0)
PLATELET # BLD AUTO: 168 10*3/MM3 (ref 130–400)
PMV BLD AUTO: 11.2 FL (ref 6–12)
POTASSIUM BLD-SCNC: 3.9 MMOL/L (ref 3.5–5.3)
PROT SERPL-MCNC: 5.8 G/DL (ref 6.3–8.7)
RBC # BLD AUTO: 3.93 10*6/MM3 (ref 4.8–5.9)
SODIUM BLD-SCNC: 139 MMOL/L (ref 135–145)
WBC NRBC COR # BLD: 6.28 10*3/MM3 (ref 4.8–10.8)

## 2018-10-12 PROCEDURE — 80053 COMPREHEN METABOLIC PANEL: CPT | Performed by: NURSE PRACTITIONER

## 2018-10-12 PROCEDURE — 36415 COLL VENOUS BLD VENIPUNCTURE: CPT | Performed by: NURSE PRACTITIONER

## 2018-10-12 PROCEDURE — 85025 COMPLETE CBC W/AUTO DIFF WBC: CPT | Performed by: NURSE PRACTITIONER

## 2018-10-22 ENCOUNTER — LAB REQUISITION (OUTPATIENT)
Dept: LAB | Facility: HOSPITAL | Age: 83
End: 2018-10-22

## 2018-10-22 DIAGNOSIS — Z00.00 ENCOUNTER FOR GENERAL ADULT MEDICAL EXAMINATION WITHOUT ABNORMAL FINDINGS: ICD-10-CM

## 2018-10-22 LAB
ANION GAP SERPL CALCULATED.3IONS-SCNC: 10 MMOL/L (ref 4–13)
BASOPHILS # BLD AUTO: 0.04 10*3/MM3 (ref 0–0.2)
BASOPHILS NFR BLD AUTO: 0.7 % (ref 0–2)
BUN BLD-MCNC: 26 MG/DL (ref 5–21)
BUN/CREAT SERPL: 29.2 (ref 7–25)
CALCIUM SPEC-SCNC: 8.9 MG/DL (ref 8.4–10.4)
CHLORIDE SERPL-SCNC: 105 MMOL/L (ref 98–110)
CO2 SERPL-SCNC: 26 MMOL/L (ref 24–31)
CREAT BLD-MCNC: 0.89 MG/DL (ref 0.5–1.4)
DEPRECATED RDW RBC AUTO: 45 FL (ref 40–54)
EOSINOPHIL # BLD AUTO: 0.19 10*3/MM3 (ref 0–0.7)
EOSINOPHIL NFR BLD AUTO: 3.5 % (ref 0–4)
ERYTHROCYTE [DISTWIDTH] IN BLOOD BY AUTOMATED COUNT: 12.1 % (ref 12–15)
GFR SERPL CREATININE-BSD FRML MDRD: 80 ML/MIN/1.73
GLUCOSE BLD-MCNC: 80 MG/DL (ref 70–100)
HCT VFR BLD AUTO: 39.2 % (ref 40–52)
HGB BLD-MCNC: 13.1 G/DL (ref 14–18)
IMM GRANULOCYTES # BLD: 0.02 10*3/MM3 (ref 0–0.03)
IMM GRANULOCYTES NFR BLD: 0.4 % (ref 0–5)
LYMPHOCYTES # BLD AUTO: 1.97 10*3/MM3 (ref 0.72–4.86)
LYMPHOCYTES NFR BLD AUTO: 36.5 % (ref 15–45)
MCH RBC QN AUTO: 33.6 PG (ref 28–32)
MCHC RBC AUTO-ENTMCNC: 33.4 G/DL (ref 33–36)
MCV RBC AUTO: 100.5 FL (ref 82–95)
MONOCYTES # BLD AUTO: 0.74 10*3/MM3 (ref 0.19–1.3)
MONOCYTES NFR BLD AUTO: 13.7 % (ref 4–12)
NEUTROPHILS # BLD AUTO: 2.44 10*3/MM3 (ref 1.87–8.4)
NEUTROPHILS NFR BLD AUTO: 45.2 % (ref 39–78)
NRBC BLD MANUAL-RTO: 0 /100 WBC (ref 0–0)
PLATELET # BLD AUTO: 173 10*3/MM3 (ref 130–400)
PMV BLD AUTO: 11.1 FL (ref 6–12)
POTASSIUM BLD-SCNC: 4.1 MMOL/L (ref 3.5–5.3)
RBC # BLD AUTO: 3.9 10*6/MM3 (ref 4.8–5.9)
SODIUM BLD-SCNC: 141 MMOL/L (ref 135–145)
WBC NRBC COR # BLD: 5.4 10*3/MM3 (ref 4.8–10.8)

## 2018-10-22 PROCEDURE — 80048 BASIC METABOLIC PNL TOTAL CA: CPT | Performed by: NURSE PRACTITIONER

## 2018-10-22 PROCEDURE — 36415 COLL VENOUS BLD VENIPUNCTURE: CPT | Performed by: NURSE PRACTITIONER

## 2018-10-22 PROCEDURE — 85025 COMPLETE CBC W/AUTO DIFF WBC: CPT | Performed by: NURSE PRACTITIONER

## 2019-06-07 ENCOUNTER — OFFICE VISIT (OUTPATIENT)
Dept: FAMILY MEDICINE CLINIC | Age: 84
End: 2019-06-07
Payer: MEDICARE

## 2019-06-07 VITALS
HEART RATE: 90 BPM | SYSTOLIC BLOOD PRESSURE: 122 MMHG | OXYGEN SATURATION: 97 % | WEIGHT: 135 LBS | BODY MASS INDEX: 19.99 KG/M2 | TEMPERATURE: 97.9 F | RESPIRATION RATE: 16 BRPM | DIASTOLIC BLOOD PRESSURE: 74 MMHG | HEIGHT: 69 IN
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
## 2019-06-07 PROCEDURE — 1123F ACP DISCUSS/DSCN MKR DOCD: CPT | Performed by: NURSE PRACTITIONER

## 2019-06-07 PROCEDURE — 99213 OFFICE O/P EST LOW 20 MIN: CPT | Performed by: NURSE PRACTITIONER

## 2019-06-07 PROCEDURE — 4040F PNEUMOC VAC/ADMIN/RCVD: CPT | Performed by: NURSE PRACTITIONER

## 2019-06-07 PROCEDURE — G8427 DOCREV CUR MEDS BY ELIG CLIN: HCPCS | Performed by: NURSE PRACTITIONER

## 2019-06-07 PROCEDURE — 1036F TOBACCO NON-USER: CPT | Performed by: NURSE PRACTITIONER

## 2019-06-07 PROCEDURE — G8420 CALC BMI NORM PARAMETERS: HCPCS | Performed by: NURSE PRACTITIONER

## 2019-06-07 RX ORDER — PREDNISONE 10 MG/1
10 TABLET ORAL 2 TIMES DAILY
Qty: 10 TABLET | Refills: 0 | Status: SHIPPED | OUTPATIENT
Start: 2019-06-07 | End: 2019-06-12

## 2019-06-07 RX ORDER — DOXYCYCLINE HYCLATE 100 MG
100 TABLET ORAL 2 TIMES DAILY
Qty: 14 TABLET | Refills: 0 | Status: SHIPPED | OUTPATIENT
Start: 2019-06-07 | End: 2019-06-07

## 2019-06-07 RX ORDER — DOXYCYCLINE HYCLATE 100 MG
100 TABLET ORAL 2 TIMES DAILY
Qty: 14 TABLET | Refills: 0 | Status: SHIPPED | OUTPATIENT
Start: 2019-06-07 | End: 2019-06-14

## 2019-06-07 ASSESSMENT — PATIENT HEALTH QUESTIONNAIRE - PHQ9
1. LITTLE INTEREST OR PLEASURE IN DOING THINGS: 0
SUM OF ALL RESPONSES TO PHQ QUESTIONS 1-9: 0
2. FEELING DOWN, DEPRESSED OR HOPELESS: 0
SUM OF ALL RESPONSES TO PHQ9 QUESTIONS 1 & 2: 0
SUM OF ALL RESPONSES TO PHQ QUESTIONS 1-9: 0

## 2019-06-07 NOTE — PROGRESS NOTES
Take 2 tablets by mouth every 4 hours as needed for Pain 120 tablet 3    diphenhydrAMINE (BENADRYL) 25 MG tablet Take 25 mg by mouth daily      Mometasone Furoate (NASONEX NA) by Nasal route       No current facility-administered medications for this visit. No Known Allergies    Health Maintenance   Topic Date Due    DTaP/Tdap/Td vaccine (1 - Tdap) 06/15/1944    Shingles Vaccine (1 of 2) 06/15/1975    Pneumococcal 65+ years Vaccine (1 of 2 - PCV13) 06/15/1990    Flu vaccine (Season Ended) 09/01/2019       Subjective:      Review of Systems   Constitutional: Negative for fever. Musculoskeletal: Negative for arthralgias. Skin: Positive for wound. Objective:     Physical Exam   Constitutional: He is oriented to person, place, and time. He appears well-developed and well-nourished. Neurological: He is alert and oriented to person, place, and time. Skin: Skin is warm. Bite behind knee is red and swollen, no streaking, no drainage   Psychiatric: His behavior is normal. Thought content normal.   Nursing note and vitals reviewed. /74 (Site: Left Upper Arm, Position: Sitting, Cuff Size: Medium Adult)   Pulse 90   Temp 97.9 °F (36.6 °C) (Oral)   Resp 16   Ht 5' 9\" (1.753 m)   Wt 135 lb (61.2 kg)   SpO2 97%   BMI 19.94 kg/m²     Assessment:       Diagnosis Orders   1. Tick bite, initial encounter  doxycycline hyclate (VIBRA-TABS) 100 MG tablet    predniSONE (DELTASONE) 10 MG tablet       Plan:    No orders of the defined types were placed in this encounter. Return if symptoms worsen or fail to improve. No orders of the defined types were placed in this encounter.     Orders Placed This Encounter   Medications    DISCONTD: doxycycline hyclate (VIBRA-TABS) 100 MG tablet     Sig: Take 1 tablet by mouth 2 times daily for 7 days     Dispense:  14 tablet     Refill:  0    doxycycline hyclate (VIBRA-TABS) 100 MG tablet     Sig: Take 1 tablet by mouth 2 times daily for 7 days Dispense:  14 tablet     Refill:  0    predniSONE (DELTASONE) 10 MG tablet     Sig: Take 1 tablet by mouth 2 times daily for 5 days     Dispense:  10 tablet     Refill:  0    predniSONE (DELTASONE) 10 MG tablet     Sig: Take 1 tablet by mouth 2 times daily for 5 days     Dispense:  10 tablet     Refill:  0       doxy and prednisone for tick bite. If febrile or worse symptoms, to ER.        Electronically signed by SUGAR Gomes on 6/7/2019 at 3:33 PM

## 2019-08-26 ENCOUNTER — HOSPITAL ENCOUNTER (EMERGENCY)
Age: 84
Discharge: HOME | End: 2019-08-26
Payer: MEDICARE

## 2019-08-26 ENCOUNTER — APPOINTMENT (OUTPATIENT)
Dept: CT IMAGING | Age: 84
End: 2019-08-26
Payer: MEDICARE

## 2019-08-26 VITALS
WEIGHT: 135 LBS | BODY MASS INDEX: 19.99 KG/M2 | TEMPERATURE: 98.6 F | HEIGHT: 69 IN | HEART RATE: 94 BPM | RESPIRATION RATE: 16 BRPM | OXYGEN SATURATION: 94 % | SYSTOLIC BLOOD PRESSURE: 124 MMHG | DIASTOLIC BLOOD PRESSURE: 74 MMHG

## 2019-08-26 DIAGNOSIS — S09.90XA INJURY OF HEAD, INITIAL ENCOUNTER: Primary | ICD-10-CM

## 2019-08-26 DIAGNOSIS — S00.01XA ABRASION OF SCALP, INITIAL ENCOUNTER: ICD-10-CM

## 2019-08-26 PROCEDURE — 99284 EMERGENCY DEPT VISIT MOD MDM: CPT

## 2019-08-26 PROCEDURE — 2500000003 HC RX 250 WO HCPCS: Performed by: NURSE PRACTITIONER

## 2019-08-26 PROCEDURE — 70450 CT HEAD/BRAIN W/O DYE: CPT

## 2019-08-26 RX ORDER — LIDOCAINE/RACEPINEP/TETRACAINE 4-0.05-0.5
SOLUTION WITH PREFILLED APPLICATOR (ML) TOPICAL ONCE
Status: COMPLETED | OUTPATIENT
Start: 2019-08-26 | End: 2019-08-26

## 2019-08-26 RX ORDER — LIDOCAINE HYDROCHLORIDE AND EPINEPHRINE 10; 10 MG/ML; UG/ML
20 INJECTION, SOLUTION INFILTRATION; PERINEURAL ONCE
Status: COMPLETED | OUTPATIENT
Start: 2019-08-26 | End: 2019-08-26

## 2019-08-26 RX ADMIN — LIDOCAINE-EPINEPHRINE-TETRACAINE EXTERNAL SOLN 4-0.05-0.5% 3 ML: 4-0.05-0.5 SOLUTION at 18:52

## 2019-08-26 RX ADMIN — LIDOCAINE HYDROCHLORIDE,EPINEPHRINE BITARTRATE 20 ML: 10; .01 INJECTION, SOLUTION INFILTRATION; PERINEURAL at 19:14

## 2019-08-27 NOTE — ED PROVIDER NOTES
[facility] EMERGENCY DEPT  eMERGENCYdEPARTMENT eNCOUnter      Pt Name: Pita Max  MRN: [id number]  [place] 6/15/1925  Date of evaluation: 8/26/2019  Provider:SUGAR Chatterjee    CHIEF COMPLAINT       Chief Complaint   Patient presents with    Head Laceration     fall from standing. . head lac. HISTORY OF PRESENT ILLNESS  (Location/Symptom, Timing/Onset, Context/Setting, Quality, Duration, Modifying Factors, Severity.)   Pita Max is a 80 y.o. male who presents to the emergency department with chief complaint of a head injury and multiple abrasions noted to the scalp. The patient reports he was feeding his cat when he was bent over and lost his balance and fell forward. He was able to get up independently and call for his daughter. His daughter brings him to the ED today as she is concerned with one abrasion on the scalp that might need suturing. Next    The patient is not anticoagulated. He did not have any loss of consciousness. He is moving all extremities. He is ambulatory. He is up-to-date on his tetanus vaccine as he has had more within the last past year. The patient has multiple scalp abrasions however the bleeding is controlled prior to presenting to the ED. This is a new chief complaint. The patient rates his symptoms as mild. He denies any other chief complaints such as a headache, dizziness, ataxia, neck pain or any other musculoskeletal complaints. The history is provided by the patient and a relative. Nursing Notes were reviewed and I agree. REVIEW OF SYSTEMS    (2-9 systems for level 4, 10 or more for level 5)     Review of Systems   Constitutional: Negative for activity change, appetite change and fever. Musculoskeletal: Negative for arthralgias, myalgias, neck pain and neck stiffness. Skin: Positive for wound.    Neurological: Negative for dizziness, tremors, seizures, syncope, facial asymmetry, speech difficulty, weakness, light-headedness, numbness

## 2020-04-17 RX ORDER — OMEPRAZOLE 20 MG/1
20 CAPSULE, DELAYED RELEASE ORAL 2 TIMES DAILY
COMMUNITY
End: 2020-09-18

## 2020-04-17 RX ORDER — THIAMINE MONONITRATE (VIT B1) 100 MG
100 TABLET ORAL DAILY
COMMUNITY
End: 2020-09-18

## 2020-04-21 ENCOUNTER — OFFICE VISIT (OUTPATIENT)
Dept: FAMILY MEDICINE CLINIC | Age: 85
End: 2020-04-21
Payer: MEDICARE

## 2020-04-21 VITALS
TEMPERATURE: 98.6 F | OXYGEN SATURATION: 96 % | RESPIRATION RATE: 16 BRPM | DIASTOLIC BLOOD PRESSURE: 74 MMHG | BODY MASS INDEX: 20.67 KG/M2 | WEIGHT: 140 LBS | SYSTOLIC BLOOD PRESSURE: 136 MMHG | HEART RATE: 92 BPM

## 2020-04-21 PROCEDURE — 4040F PNEUMOC VAC/ADMIN/RCVD: CPT | Performed by: FAMILY MEDICINE

## 2020-04-21 PROCEDURE — G8427 DOCREV CUR MEDS BY ELIG CLIN: HCPCS | Performed by: FAMILY MEDICINE

## 2020-04-21 PROCEDURE — 1123F ACP DISCUSS/DSCN MKR DOCD: CPT | Performed by: FAMILY MEDICINE

## 2020-04-21 PROCEDURE — 99213 OFFICE O/P EST LOW 20 MIN: CPT | Performed by: FAMILY MEDICINE

## 2020-04-21 PROCEDURE — 1036F TOBACCO NON-USER: CPT | Performed by: FAMILY MEDICINE

## 2020-04-21 PROCEDURE — G8420 CALC BMI NORM PARAMETERS: HCPCS | Performed by: FAMILY MEDICINE

## 2020-04-21 ASSESSMENT — ENCOUNTER SYMPTOMS
ALLERGIC/IMMUNOLOGIC NEGATIVE: 1
GASTROINTESTINAL NEGATIVE: 1
RESPIRATORY NEGATIVE: 1
EYES NEGATIVE: 1

## 2020-09-08 ENCOUNTER — OFFICE VISIT (OUTPATIENT)
Dept: FAMILY MEDICINE CLINIC | Age: 85
End: 2020-09-08
Payer: MEDICARE

## 2020-09-08 VITALS
SYSTOLIC BLOOD PRESSURE: 118 MMHG | HEART RATE: 90 BPM | TEMPERATURE: 98.8 F | DIASTOLIC BLOOD PRESSURE: 74 MMHG | OXYGEN SATURATION: 97 % | BODY MASS INDEX: 19.64 KG/M2 | RESPIRATION RATE: 16 BRPM | WEIGHT: 133 LBS

## 2020-09-08 PROCEDURE — 1036F TOBACCO NON-USER: CPT | Performed by: FAMILY MEDICINE

## 2020-09-08 PROCEDURE — 4040F PNEUMOC VAC/ADMIN/RCVD: CPT | Performed by: FAMILY MEDICINE

## 2020-09-08 PROCEDURE — G8420 CALC BMI NORM PARAMETERS: HCPCS | Performed by: FAMILY MEDICINE

## 2020-09-08 PROCEDURE — 99213 OFFICE O/P EST LOW 20 MIN: CPT | Performed by: FAMILY MEDICINE

## 2020-09-08 PROCEDURE — G8427 DOCREV CUR MEDS BY ELIG CLIN: HCPCS | Performed by: FAMILY MEDICINE

## 2020-09-08 PROCEDURE — 1123F ACP DISCUSS/DSCN MKR DOCD: CPT | Performed by: FAMILY MEDICINE

## 2020-09-08 ASSESSMENT — PATIENT HEALTH QUESTIONNAIRE - PHQ9
2. FEELING DOWN, DEPRESSED OR HOPELESS: 0
SUM OF ALL RESPONSES TO PHQ QUESTIONS 1-9: 0
SUM OF ALL RESPONSES TO PHQ9 QUESTIONS 1 & 2: 0
1. LITTLE INTEREST OR PLEASURE IN DOING THINGS: 0
SUM OF ALL RESPONSES TO PHQ QUESTIONS 1-9: 0

## 2020-09-08 NOTE — PROGRESS NOTES
Nick Klein is a 80 y.o. male who presents today for   Chief Complaint   Patient presents with    Encopresis       Chief Complaint: Constipation    HPI  Patient reports that for the past 6 months has been having some issues with his bowel movements. He states that initially he got plugged up and had to use an enema to have a bowel movement. He states that he has adjusted his diet and started eating all brand daily and with doing so has been more regular with his bowel movements. He states that he has a history of polyps in his colon but he referred to them as stones. On review of his history in 2015 he was having issues with GI bleed which she did remember and ended up undergoing an open lower anterior sigmoid resection for the removal of both a large polyp as well as diverticular disease to allow for future colonoscopy and colonoscopic polypectomy. Following his hospitalization and healing he was set up with GI for further evaluation and had a colonoscopy set up which was scheduled for the end of January 2016 however it was canceled and he never went through with the colonoscopy. He is questioning at this point whether the remaining polyps could be giving him his problem though at this time with the dietary changes he is no longer having any significant issues and reports that he is doing well, though he does report that with his dietary changes he is actually noticed that he has been losing some weight. Review of Systems   Constitutional: Negative for activity change, appetite change, fatigue and fever. HENT: Negative for congestion, rhinorrhea and sore throat. Eyes: Negative for pain and discharge. Respiratory: Negative for cough, shortness of breath and wheezing. Cardiovascular: Negative for chest pain and palpitations. Gastrointestinal: Positive for constipation. Negative for abdominal pain, diarrhea, nausea and vomiting. Endocrine: Negative for cold intolerance and heat intolerance. Genitourinary: Negative for dysuria and hematuria. Musculoskeletal: Negative for back pain, gait problem and neck pain. Skin: Negative for rash and wound. Neurological: Negative for syncope and weakness. Hematological: Negative for adenopathy. Does not bruise/bleed easily. Psychiatric/Behavioral: Negative for dysphoric mood and sleep disturbance. The patient is not nervous/anxious. 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)     and face    Malignant melanoma of skin (HCC)     Melena     Senile dementia (HCC)        Current Outpatient Medications   Medication Sig Dispense Refill    vitamin B-1 (THIAMINE) 100 MG tablet Take 100 mg by mouth daily      omeprazole (PRILOSEC) 20 MG delayed release capsule Take 20 mg by mouth 2 times daily      Cholecalciferol 50 MCG (2000 UT) TABS Take 1 tablet by mouth daily      acetaminophen (TYLENOL) 325 MG tablet Take 2 tablets by mouth every 4 hours as needed for Pain (Patient not taking: Reported on 9/8/2020) 120 tablet 3    diphenhydrAMINE (BENADRYL) 25 MG tablet Take 25 mg by mouth daily      Mometasone Furoate (NASONEX NA) by Nasal route       No current facility-administered medications for this visit.            Allergies   Allergen Reactions    Lipitor [Atorvastatin]     Pravastatin        Past Surgical History:   Procedure Laterality Date    APPENDECTOMY      COLON SURGERY      resection    COLONOSCOPY  08/24/2015    Dr. Canela Diss  8/28/15    open low anterior sigmoid resection    OR FEMUR/KNEE SURG UNLISTED Right 9/9/2018    RIGHT TFN performed by Corin Oro MD at [address]  10/07/2015     [facility]    UPPER GASTROINTESTINAL ENDOSCOPY  08/24/2015    Dr. Renny Gonzales       Social History     Tobacco Use    Smoking status: Never Smoker    Smokeless tobacco: Never Used   Substance Use Topics    Alcohol use: Yes     Alcohol/week: 21.0 standard drinks     Types: 14 Glasses of wine, 7 Cans of beer per week     Comment: 4 glasses of wine a day    Drug use: No       Family History   Problem Relation Age of Onset    Cancer Mother     Breast Cancer Mother     Colon Cancer Neg Hx     Colon Polyps Neg Hx     Esophageal Cancer Neg Hx     Liver Cancer Neg Hx     Liver Disease Neg Hx     Rectal Cancer Neg Hx     Stomach Cancer Neg Hx        /74 (Site: Left Upper Arm, Position: Sitting, Cuff Size: Large Adult)   Pulse 90   Temp 98.8 °F (37.1 °C) (Oral)   Resp 16   Wt 133 lb (60.3 kg)   SpO2 97%   BMI 19.64 kg/m²     Physical Exam  Vitals signs and nursing note reviewed. Constitutional:       General: He is not in acute distress. Appearance: Normal appearance. He is well-developed. He is not diaphoretic. HENT:      Head: Normocephalic and atraumatic. Right Ear: External ear normal.      Left Ear: External ear normal.      Mouth/Throat:      Comments: Mask in place  Eyes:      General: No scleral icterus. Right eye: No discharge. Left eye: No discharge. Conjunctiva/sclera: Conjunctivae normal.   Neck:      Musculoskeletal: Neck supple. No muscular tenderness. Trachea: No tracheal deviation. Cardiovascular:      Rate and Rhythm: Normal rate and regular rhythm. Heart sounds: Normal heart sounds. No murmur. No friction rub. No gallop. Pulmonary:      Effort: Pulmonary effort is normal. No respiratory distress. Breath sounds: Normal breath sounds. No wheezing or rales. Abdominal:      General: Bowel sounds are normal. There is no distension. Palpations: Abdomen is soft. Tenderness: There is no abdominal tenderness.    Musculoskeletal: General: No deformity (No gross deformities of upper or lower extremities) or signs of injury. Right lower leg: No edema. Left lower leg: No edema. Lymphadenopathy:      Cervical: No cervical adenopathy. Skin:     General: Skin is warm and dry. Findings: No erythema or rash. Neurological:      Mental Status: He is alert and oriented to person, place, and time. Cranial Nerves: No cranial nerve deficit. Psychiatric:         Behavior: Behavior normal.         Thought Content: Thought content normal.         Assessment:       ICD-10-CM    1. Personal history of colonic polyps  Z86.010 Gustavo Beltran, SUGAR Nash, Gastroenterology, [place]   2. H/O partial resection of colon  Z90.49 Gustavo Beltran, SUGAR Nash, Gastroenterology, [place]   3. Constipation, unspecified constipation type  K59.00 SUGAR Calix, Gastroenterology, [place]   4. Weight loss  R63.4 Gustavo Beltran, SUGAR Nash, Gastroenterology, [place]       Plan:  Discussed patient's symptoms and the possibilities of further work-up and management options. With his history discussed possibility of getting him back to GI and since he was seen by Chandan Schmitt who is still currently with GI at [facility] and may have some prior knowledge of his history discussed with him the possibility of getting him back to her for recommendations moving forward. With the polyps being present at least as early as 5 years ago and his symptoms it is a little bit more concerning but at the same time he is 80years old and is uncertain what he is interested/willing to do at this point for further work-up and management but does seem at least at this time interested and willing to undergo a colonoscopy for further evaluation if that is what is determined to be recommended. At this time he is going to continue with his all brand as it is doing well at managing his symptoms. Discussed signs symptoms requiring medical attention.   All questions were answered patient voiced understanding and agreement with plan as discussed. Orders Placed This Encounter   Procedures   SUGAR Ewing, Gastroenterology, [place]     Referral Priority:   Routine     Referral Type:   Eval and Treat     Referral Reason:   Specialty Services Required     Referred to Provider:   SUGAR Jha     Requested Specialty:   [facility] Nurse Practitioner     Number of Visits Requested:   1     No orders of the defined types were placed in this encounter. There are no discontinued medications. Patient Instructions   Patient given educational handouts and has had all questions answered. Patient voices understanding and agrees to plans along with risks and benefits of plan. Patient is instructed to continue prior meds,diet, and exercise plans as instructed. Patient agrees to follow up as instructed and sooner if needed. Patient agrees to go to ER if condition becomes emergent. Return if symptoms worsen or fail to improve, for next scheduled follow up with PCP.

## 2020-09-10 ASSESSMENT — ENCOUNTER SYMPTOMS
ABDOMINAL PAIN: 0
WHEEZING: 0
EYE DISCHARGE: 0
BACK PAIN: 0
SHORTNESS OF BREATH: 0
COUGH: 0
RHINORRHEA: 0
DIARRHEA: 0
SORE THROAT: 0
CONSTIPATION: 1
NAUSEA: 0
EYE PAIN: 0
VOMITING: 0

## 2020-09-10 NOTE — PATIENT INSTRUCTIONS
Patient Education        Colon Polyps: Care Instructions  Your Care Instructions     Colon polyps are growths in the colon or the rectum. The cause of most colon polyps is not known, and most people who get them do not have any problems. But a certain kind can turn into cancer. For this reason, regular testing for colon polyps is important for people as they get older. It is also important for anyone who has an increased risk for colon cancer. Polyps are usually found through routine colon cancer screening tests. Although most colon polyps are not cancerous, they are usually removed and then tested for cancer. Screening for colon cancer saves lives because the cancer can usually be cured if it is caught early. If you have a polyp that is the type that can turn into cancer, you may need more tests to examine your entire colon. The doctor will remove any other polyps that he or she finds, and you will be tested more often. Follow-up care is a key part of your treatment and safety. Be sure to make and go to all appointments, and call your doctor if you are having problems. It's also a good idea to know your test results and keep a list of the medicines you take. How can you care for yourself at home? Regular exams to look for colon polyps are the best way to prevent polyps from turning into colon cancer. These can include stool tests, sigmoidoscopy, colonoscopy, and CT colonography. Talk with your doctor about a testing schedule that is right for you. To prevent polyps  There is no home treatment that can prevent colon polyps. But these steps may help lower your risk for cancer. · Stay active. Being active can help you get to and stay at a healthy weight. Try to exercise on most days of the week. Walking is a good choice. · Eat well. Choose a variety of vegetables, fruits, legumes (such as peas and beans), fish, poultry, and whole grains. · Do not smoke.  If you need help quitting, talk to your doctor about stop-smoking programs and medicines. These can increase your chances of quitting for good. · If you drink alcohol, limit how much you drink. Limit alcohol to 2 drinks a day for men and 1 drink a day for women. When should you call for help? Call your doctor now or seek immediate medical care if:  · You have severe belly pain. · Your stools are maroon or very bloody. Watch closely for changes in your health, and be sure to contact your doctor if:  · You have a fever. · You have nausea or vomiting. · You have a change in bowel habits (new constipation or diarrhea). · Your symptoms get worse or are not improving as expected. Where can you learn more? Go to https://[employer].eb.[employer]. org and sign in to your [employer] account. Enter [phone] [id number] in the [employer] box to learn more about \"Colon Polyps: Care Instructions. \"     If you do not have an account, please click on the \"Sign Up Now\" link. Current as of: August 22, 2019               Content Version: 12.5  © 5652-3327 [employer]. Care instructions adapted under license by [employer]. If you have questions about a medical condition or this instruction, always ask your healthcare professional. [place] [address] any warranty or liability for your use of this information. Patient Education        Constipation: Care Instructions  Your Care Instructions     Constipation means that you have a hard time passing stools (bowel movements). People pass stools from 3 times a day to once every 3 days. What is normal for you may be different. Constipation may occur with pain in the rectum and cramping. The pain may get worse when you try to pass stools. Sometimes there are small amounts of bright red blood on toilet paper or the surface of stools. This is because of enlarged veins near the rectum (hemorrhoids). A few changes in your diet and lifestyle may help you avoid ongoing constipation.  Your doctor may also sure to contact your doctor if:  · Your constipation is getting worse. · You do not get better as expected. Where can you learn more? Go to https://chpepiceweb.[employer]. org and sign in to your [employer] account. Enter 21 [phone] in the [facility] box to learn more about \"Constipation: Care Instructions. \"     If you do not have an account, please click on the \"Sign Up Now\" link. Current as of: June 26, 2019               Content Version: 12.5  © 5114-2684 [employer]. Care instructions adapted under license by [facility] ([facility]). If you have questions about a medical condition or this instruction, always ask your healthcare professional. [address] any warranty or liability for your use of this information.

## 2020-09-18 ENCOUNTER — OFFICE VISIT (OUTPATIENT)
Dept: GASTROENTEROLOGY | Age: 85
End: 2020-09-18
Payer: OTHER GOVERNMENT

## 2020-09-18 VITALS
HEIGHT: 70 IN | BODY MASS INDEX: 19.18 KG/M2 | SYSTOLIC BLOOD PRESSURE: 120 MMHG | DIASTOLIC BLOOD PRESSURE: 70 MMHG | HEART RATE: 86 BPM | WEIGHT: 134 LBS | OXYGEN SATURATION: 95 %

## 2020-09-18 PROBLEM — F10.10 ALCOHOL ABUSE: Status: ACTIVE | Noted: 2020-09-18

## 2020-09-18 PROBLEM — R15.9 INCONTINENCE OF FECES: Status: ACTIVE | Noted: 2020-09-18

## 2020-09-18 PROBLEM — R19.7 DIARRHEA: Status: ACTIVE | Noted: 2020-09-18

## 2020-09-18 PROCEDURE — 99214 OFFICE O/P EST MOD 30 MIN: CPT | Performed by: NURSE PRACTITIONER

## 2020-09-18 ASSESSMENT — ENCOUNTER SYMPTOMS
COUGH: 0
ABDOMINAL DISTENTION: 0
SHORTNESS OF BREATH: 0
BLOOD IN STOOL: 0
CONSTIPATION: 0
RECTAL PAIN: 0
ANAL BLEEDING: 0
VOICE CHANGE: 0
ABDOMINAL PAIN: 0
TROUBLE SWALLOWING: 0
DIARRHEA: 1
NAUSEA: 0
SORE THROAT: 0
VOMITING: 0
BACK PAIN: 0

## 2020-09-18 NOTE — PROGRESS NOTES
([facility] [address]     Allergic rhinitis     Blood in stool     BPH (benign prostatic hypertrophy)     Hearing loss     History of colon polyps     Hyperlipidemia     Malignant melanoma of head and neck (HCC)     and face    Malignant melanoma of skin (HCC)     Melena     Senile dementia (HCC)           Past Surgical History:   Procedure Laterality Date    APPENDECTOMY      COLON SURGERY      resection    COLONOSCOPY  08/24/2015    Dr. Tatum Coe:  AP    OTHER SURGICAL HISTORY  8/28/15    open low anterior sigmoid resection    [place] FEMUR/KNEE SURG UNLISTED Right 9/9/2018    RIGHT TFN performed by Sravani Cadena MD at [address]  10/07/2015    Dr. Janet Bonilla  08/24/2015    Dr. Tatum Coe- normal       Social History     Socioeconomic History    Marital status:      Spouse name: None    Number of children: None    Years of education: None    Highest education level: None   Occupational History    None   Social Needs    Financial resource strain: None    Food insecurity     Worry: None     Inability: None    Transportation needs     Medical: None     Non-medical: None   Tobacco Use    Smoking status: Never Smoker    Smokeless tobacco: Never Used   Substance and Sexual Activity    Alcohol use:  Yes     Alcohol/week: 21.0 standard drinks     Types: 14 Glasses of wine, 7 Cans of beer per week     Comment: 4 glasses of wine a day    Drug use: No    Sexual activity: None   Lifestyle    Physical activity     Days per week: None     Minutes per session: None    Stress: None   Relationships    Social connections     Talks on phone: None     Gets together: None     Attends Zoroastrian service: None     Active member of club or organization: None     Attends meetings of clubs or organizations: None     Relationship status: None    Intimate partner violence     Fear of current or ex partner: None     Emotionally abused: None     Physically abused: None Forced sexual activity: None   Other Topics Concern    None   Social History Narrative    None       Allergies   Allergen Reactions    Lipitor [Atorvastatin]     Pravastatin        No current outpatient medications on file. No current facility-administered medications for this visit. Review of Systems   Constitutional: Negative for appetite change, fatigue, fever and unexpected weight change. HENT: Positive for hearing loss. Negative for sore throat, trouble swallowing and voice change. Respiratory: Negative for cough and shortness of breath. Cardiovascular: Negative for chest pain, palpitations and leg swelling. Gastrointestinal: Positive for diarrhea. Negative for abdominal distention, abdominal pain, anal bleeding, blood in stool, constipation, nausea, rectal pain and vomiting. Genitourinary: Negative for hematuria. Musculoskeletal: Negative for arthralgias, back pain and neck pain. Neurological: Negative for dizziness, weakness, light-headedness and headaches. Psychiatric/Behavioral: Positive for confusion and dysphoric mood. Negative for sleep disturbance. The patient is not nervous/anxious. All other systems reviewed and are negative. Objective:     Physical Exam  Vitals signs and nursing note reviewed. Constitutional:       Appearance: He is well-developed. Comments: /70   Pulse 86   Ht 5' 10\" (1.778 m)   Wt 134 lb (60.8 kg)   SpO2 95%   BMI 19.23 kg/m²    Eyes:      General: No scleral icterus. Conjunctiva/sclera: Conjunctivae normal.      Pupils: Pupils are equal, round, and reactive to light. Neck:      Musculoskeletal: Normal range of motion and neck supple. Thyroid: No thyromegaly. Cardiovascular:      Rate and Rhythm: Normal rate and regular rhythm. Heart sounds: Normal heart sounds. No murmur. No friction rub. No gallop. Pulmonary:      Effort: Pulmonary effort is normal. No respiratory distress.       Breath sounds: Normal breath sounds. Abdominal:      General: Bowel sounds are normal. There is no distension. Palpations: Abdomen is soft. Tenderness: There is no abdominal tenderness. There is no rebound. Musculoskeletal: Normal range of motion. General: No deformity. Skin:     Coloration: Skin is not pale. Neurological:      Mental Status: He is alert and oriented to person, place, and time. Cranial Nerves: No cranial nerve deficit. Psychiatric:         Judgment: Judgment normal.           Assessment:       Diagnosis Orders   1. Diarrhea, unspecified type     2. Incontinence of feces, unspecified fecal incontinence type     3. Alcohol abuse           Plan:      1. I counseled the pt on his ETOH consumption and the risks associated with this. He voices understanding, but his children in the room report that he simply will not stop drinking ETOH. Discussed AA, they are not interested in this. 2. Advised the pt the importance of eating solid foods and a high protein diet. 3. Performed a diatherix stool test in the office. Will call Collis Babinski ([phone]) with results. If negative will escribe welchol for him  4. They defer a colonoscopic evaluation.

## 2020-09-21 ENCOUNTER — TELEPHONE (OUTPATIENT)
Dept: GASTROENTEROLOGY | Age: 85
End: 2020-09-21

## 2020-09-21 RX ORDER — COLESEVELAM 180 1/1
TABLET ORAL
Qty: 60 TABLET | Refills: 11 | Status: ON HOLD | OUTPATIENT
Start: 2020-09-21 | End: 2021-05-24

## 2020-09-21 NOTE — TELEPHONE ENCOUNTER
Cindy, please call the patients daughter. And let her know I have reviewed the stool testing and it is NEGATIVE for anything infectious. So at this time I will prescribe welchol to help bulk the stools to see if this helps. I prescribed it for BID use. He can start taking it once daily for a week or 2, and if he is still having loose stools he should increase it to BID dosing. Let us know if this isnt covered. Can make an appt (VV is fine) in about a month if this isnt effective.  If he comes for OV appt he needs to be last appt before lunch or at end of day please, thanks

## 2021-01-01 ENCOUNTER — TRANSCRIBE ORDERS (OUTPATIENT)
Dept: ADMINISTRATIVE | Facility: HOSPITAL | Age: 86
End: 2021-01-01

## 2021-01-01 ENCOUNTER — HOSPITAL ENCOUNTER (OUTPATIENT)
Dept: ULTRASOUND IMAGING | Facility: HOSPITAL | Age: 86
Discharge: HOME | End: 2021-05-14

## 2021-01-01 ENCOUNTER — HOSPITAL ENCOUNTER (OUTPATIENT)
Dept: GENERAL RADIOLOGY | Facility: HOSPITAL | Age: 86
Discharge: HOME | End: 2021-05-14

## 2021-01-01 DIAGNOSIS — R05.9 COUGH: ICD-10-CM

## 2021-01-01 DIAGNOSIS — R63.0 POOR APPETITE: Primary | ICD-10-CM

## 2021-01-01 DIAGNOSIS — R63.4 LOSS OF WEIGHT: ICD-10-CM

## 2021-01-01 DIAGNOSIS — R63.0 POOR APPETITE: ICD-10-CM

## 2021-01-01 DIAGNOSIS — R10.9 STOMACH ACHE: Primary | ICD-10-CM

## 2021-01-01 DIAGNOSIS — R10.9 STOMACH ACHE: ICD-10-CM

## 2021-01-01 PROCEDURE — 71046 X-RAY EXAM CHEST 2 VIEWS: CPT

## 2021-01-01 PROCEDURE — 76700 US EXAM ABDOM COMPLETE: CPT

## 2021-01-07 ENCOUNTER — TELEPHONE (OUTPATIENT)
Dept: GASTROENTEROLOGY | Age: 86
End: 2021-01-07

## 2021-01-07 DIAGNOSIS — R15.9 INCONTINENCE OF FECES, UNSPECIFIED FECAL INCONTINENCE TYPE: ICD-10-CM

## 2021-01-07 DIAGNOSIS — R19.7 DIARRHEA, UNSPECIFIED TYPE: Primary | ICD-10-CM

## 2021-01-07 RX ORDER — CHOLESTYRAMINE 4 G/9G
1 POWDER, FOR SUSPENSION ORAL 2 TIMES DAILY
Qty: 60 PACKET | Refills: 11 | Status: ON HOLD | OUTPATIENT
Start: 2021-01-07 | End: 2021-05-24

## 2021-01-07 NOTE — TELEPHONE ENCOUNTER
Assessment:        Diagnosis Orders   1. Diarrhea, unspecified type      2. Incontinence of feces, unspecified fecal incontinence type      3. Alcohol abuse                       Plan:      1. I counseled the pt on his ETOH consumption and the risks associated with this. He voices understanding, but his children in the room report that he simply will not stop drinking ETOH. Discussed AA, they are not interested in this. 2. Advised the pt the importance of eating solid foods and a high protein diet. 3. Performed a diatherix stool test in the office. Will call Liza Bueno ([phone]) with results. If negative will escribe welchol for him  4. They defer a colonoscopic evaluation. Last OV with [facility] aprn 9-20-20, see above. No FU scheduled. Diatherix Stool Test normal dated 9-21-20. Patient's daughter called today from [phone] said her Father 81 yo who lives on his own is still having diarrhea and has to use depends daily, has several accidents. Welchol was prescribed but the daughter said he had trouble swallowing this and stopped taking, she is asking if there is a powder she can mix as a liquid for the diarrhea. Daughter said he continues to drinks alcohol  daily and his daily diet is liquid, said he is a very stubborn old man who won't listen to anyone and she is just trying to find something that might slow the diarrhea down for him. I will forward to [facility] aprn for review and further recommendations. Colonoscopy was defer due to his age and sedation.  Silvano golden

## 2021-03-30 ENCOUNTER — IMMUNIZATION (OUTPATIENT)
Age: 86
End: 2021-03-30
Payer: MEDICARE

## 2021-03-30 PROCEDURE — 0001A COVID-19, PFIZER VACCINE 30MCG/0.3ML DOSE: CPT | Performed by: FAMILY MEDICINE

## 2021-03-30 PROCEDURE — 91300 COVID-19, PFIZER VACCINE 30MCG/0.3ML DOSE: CPT | Performed by: FAMILY MEDICINE

## 2021-04-20 ENCOUNTER — IMMUNIZATION (OUTPATIENT)
Age: 86
End: 2021-04-20
Payer: MEDICARE

## 2021-04-20 PROCEDURE — 91300 COVID-19, PFIZER VACCINE 30MCG/0.3ML DOSE: CPT | Performed by: FAMILY MEDICINE

## 2021-04-20 PROCEDURE — 0002A PR IMM ADMN SARSCOV2 30MCG/0.3ML DIL RECON 2ND DOSE: CPT | Performed by: FAMILY MEDICINE

## 2021-05-24 ENCOUNTER — APPOINTMENT (OUTPATIENT)
Dept: CT IMAGING | Age: 86
DRG: 193 | End: 2021-05-24
Payer: OTHER GOVERNMENT

## 2021-05-24 ENCOUNTER — APPOINTMENT (OUTPATIENT)
Dept: GENERAL RADIOLOGY | Age: 86
DRG: 193 | End: 2021-05-24
Payer: OTHER GOVERNMENT

## 2021-05-24 ENCOUNTER — HOSPITAL ENCOUNTER (INPATIENT)
Age: 86
LOS: 3 days | Discharge: NURSING, REHAB OR LONG-TERM CARE | DRG: 193 | End: 2021-05-27
Attending: EMERGENCY MEDICINE | Admitting: INTERNAL MEDICINE
Payer: OTHER GOVERNMENT

## 2021-05-24 DIAGNOSIS — J96.01 ACUTE RESPIRATORY FAILURE WITH HYPOXIA (HCC): ICD-10-CM

## 2021-05-24 DIAGNOSIS — J18.9 PNEUMONIA DUE TO ORGANISM: Primary | ICD-10-CM

## 2021-05-24 LAB
ADENOVIRUS BY PCR: NOT DETECTED
ALBUMIN SERPL-MCNC: 2.7 G/DL (ref 3.5–5.2)
ALP BLD-CCNC: 55 U/L (ref 40–130)
ALT SERPL-CCNC: 11 U/L (ref 5–41)
ANION GAP SERPL CALCULATED.3IONS-SCNC: 9 MMOL/L (ref 7–19)
AST SERPL-CCNC: 23 U/L (ref 5–40)
BASE EXCESS ARTERIAL: 3.7 MMOL/L (ref -2–2)
BASOPHILS ABSOLUTE: 0 K/UL (ref 0–0.2)
BASOPHILS RELATIVE PERCENT: 0.4 % (ref 0–1)
BILIRUB SERPL-MCNC: 1 MG/DL (ref 0.2–1.2)
BORDETELLA PARAPERTUSSIS BY PCR: NOT DETECTED
BORDETELLA PERTUSSIS BY PCR: NOT DETECTED
BUN BLDV-MCNC: 44 MG/DL (ref 8–23)
C-REACTIVE PROTEIN: 9.85 MG/DL (ref 0–0.5)
CALCIUM SERPL-MCNC: 9 MG/DL (ref 8.2–9.6)
CARBOXYHEMOGLOBIN ARTERIAL: 1.9 % (ref 0–5)
CHLAMYDOPHILIA PNEUMONIAE BY PCR: NOT DETECTED
CHLORIDE BLD-SCNC: 108 MMOL/L (ref 98–111)
CO2: 27 MMOL/L (ref 22–29)
CORONAVIRUS 229E BY PCR: NOT DETECTED
CORONAVIRUS HKU1 BY PCR: NOT DETECTED
CORONAVIRUS NL63 BY PCR: NOT DETECTED
CORONAVIRUS OC43 BY PCR: NOT DETECTED
CREAT SERPL-MCNC: 0.7 MG/DL (ref 0.5–1.2)
EKG P AXIS: NORMAL DEGREES
EKG P-R INTERVAL: NORMAL MS
EKG Q-T INTERVAL: 384 MS
EKG QRS DURATION: 132 MS
EKG QTC CALCULATION (BAZETT): 420 MS
EKG T AXIS: 62 DEGREES
EOSINOPHILS ABSOLUTE: 0 K/UL (ref 0–0.6)
EOSINOPHILS RELATIVE PERCENT: 0 % (ref 0–5)
GFR AFRICAN AMERICAN: >59
GFR NON-AFRICAN AMERICAN: >60
GLUCOSE BLD-MCNC: 117 MG/DL (ref 74–109)
HCO3 ARTERIAL: 26.3 MMOL/L (ref 22–26)
HCT VFR BLD CALC: 42.7 % (ref 42–52)
HEMOGLOBIN, ART, EXTENDED: 13.9 G/DL (ref 14–18)
HEMOGLOBIN: 14.6 G/DL (ref 14–18)
HUMAN METAPNEUMOVIRUS BY PCR: NOT DETECTED
HUMAN RHINOVIRUS/ENTEROVIRUS BY PCR: NOT DETECTED
IMMATURE GRANULOCYTES #: 0 K/UL
INFLUENZA A BY PCR: NOT DETECTED
INFLUENZA B BY PCR: NOT DETECTED
LACTIC ACID: 1.6 MMOL/L (ref 0.5–1.9)
LACTIC ACID: 2 MMOL/L (ref 0.5–1.9)
LYMPHOCYTES ABSOLUTE: 0.6 K/UL (ref 1.1–4.5)
LYMPHOCYTES RELATIVE PERCENT: 12.4 % (ref 20–40)
MCH RBC QN AUTO: 34 PG (ref 27–31)
MCHC RBC AUTO-ENTMCNC: 34.2 G/DL (ref 33–37)
MCV RBC AUTO: 99.5 FL (ref 80–94)
METHEMOGLOBIN ARTERIAL: 1.4 %
MONOCYTES ABSOLUTE: 0.3 K/UL (ref 0–0.9)
MONOCYTES RELATIVE PERCENT: 6.9 % (ref 0–10)
MYCOPLASMA PNEUMONIAE BY PCR: NOT DETECTED
NEUTROPHILS ABSOLUTE: 3.6 K/UL (ref 1.5–7.5)
NEUTROPHILS RELATIVE PERCENT: 79.9 % (ref 50–65)
O2 CONTENT ARTERIAL: 16.1 ML/DL
O2 SAT, ARTERIAL: 82.6 %
O2 THERAPY: ABNORMAL
PARAINFLUENZA VIRUS 1 BY PCR: NOT DETECTED
PARAINFLUENZA VIRUS 2 BY PCR: NOT DETECTED
PARAINFLUENZA VIRUS 3 BY PCR: NOT DETECTED
PARAINFLUENZA VIRUS 4 BY PCR: NOT DETECTED
PCO2 ARTERIAL: 33 MMHG (ref 35–45)
PDW BLD-RTO: 14 % (ref 11.5–14.5)
PH ARTERIAL: 7.51 (ref 7.35–7.45)
PLATELET # BLD: 144 K/UL (ref 130–400)
PMV BLD AUTO: 11.7 FL (ref 9.4–12.4)
PO2 ARTERIAL: 40 MMHG (ref 80–100)
POTASSIUM SERPL-SCNC: 3.9 MMOL/L (ref 3.5–5)
POTASSIUM, WHOLE BLOOD: 3.4
PRO-BNP: 3682 PG/ML (ref 0–1800)
PROCALCITONIN: 0.79 NG/ML (ref 0–0.09)
RBC # BLD: 4.29 M/UL (ref 4.7–6.1)
RESPIRATORY SYNCYTIAL VIRUS BY PCR: NOT DETECTED
SARS-COV-2, PCR: NOT DETECTED
SEDIMENTATION RATE, ERYTHROCYTE: 31 MM/HR (ref 0–15)
SODIUM BLD-SCNC: 144 MMOL/L (ref 136–145)
TOTAL PROTEIN: 6 G/DL (ref 6.6–8.7)
WBC # BLD: 4.5 K/UL (ref 4.8–10.8)

## 2021-05-24 PROCEDURE — 85652 RBC SED RATE AUTOMATED: CPT

## 2021-05-24 PROCEDURE — 84132 ASSAY OF SERUM POTASSIUM: CPT

## 2021-05-24 PROCEDURE — 93010 ELECTROCARDIOGRAM REPORT: CPT | Performed by: INTERNAL MEDICINE

## 2021-05-24 PROCEDURE — 87040 BLOOD CULTURE FOR BACTERIA: CPT

## 2021-05-24 PROCEDURE — 85025 COMPLETE CBC W/AUTO DIFF WBC: CPT

## 2021-05-24 PROCEDURE — 6360000002 HC RX W HCPCS: Performed by: INTERNAL MEDICINE

## 2021-05-24 PROCEDURE — 2580000003 HC RX 258: Performed by: EMERGENCY MEDICINE

## 2021-05-24 PROCEDURE — 0202U NFCT DS 22 TRGT SARS-COV-2: CPT

## 2021-05-24 PROCEDURE — 99284 EMERGENCY DEPT VISIT MOD MDM: CPT

## 2021-05-24 PROCEDURE — 96361 HYDRATE IV INFUSION ADD-ON: CPT

## 2021-05-24 PROCEDURE — 83605 ASSAY OF LACTIC ACID: CPT

## 2021-05-24 PROCEDURE — 1210000000 HC MED SURG R&B

## 2021-05-24 PROCEDURE — 84145 PROCALCITONIN (PCT): CPT

## 2021-05-24 PROCEDURE — 96365 THER/PROPH/DIAG IV INF INIT: CPT

## 2021-05-24 PROCEDURE — 86140 C-REACTIVE PROTEIN: CPT

## 2021-05-24 PROCEDURE — 71250 CT THORAX DX C-: CPT

## 2021-05-24 PROCEDURE — 80053 COMPREHEN METABOLIC PANEL: CPT

## 2021-05-24 PROCEDURE — 6360000002 HC RX W HCPCS: Performed by: EMERGENCY MEDICINE

## 2021-05-24 PROCEDURE — 82803 BLOOD GASES ANY COMBINATION: CPT

## 2021-05-24 PROCEDURE — 96375 TX/PRO/DX INJ NEW DRUG ADDON: CPT

## 2021-05-24 PROCEDURE — 2580000003 HC RX 258: Performed by: INTERNAL MEDICINE

## 2021-05-24 PROCEDURE — 36415 COLL VENOUS BLD VENIPUNCTURE: CPT

## 2021-05-24 PROCEDURE — 36600 WITHDRAWAL OF ARTERIAL BLOOD: CPT

## 2021-05-24 PROCEDURE — 83880 ASSAY OF NATRIURETIC PEPTIDE: CPT

## 2021-05-24 PROCEDURE — 6370000000 HC RX 637 (ALT 250 FOR IP): Performed by: INTERNAL MEDICINE

## 2021-05-24 PROCEDURE — 93005 ELECTROCARDIOGRAM TRACING: CPT | Performed by: EMERGENCY MEDICINE

## 2021-05-24 PROCEDURE — 71045 X-RAY EXAM CHEST 1 VIEW: CPT

## 2021-05-24 RX ORDER — ACETAMINOPHEN 325 MG/1
650 TABLET ORAL EVERY 6 HOURS PRN
Status: DISCONTINUED | OUTPATIENT
Start: 2021-05-24 | End: 2021-05-27 | Stop reason: HOSPADM

## 2021-05-24 RX ORDER — 0.9 % SODIUM CHLORIDE 0.9 %
1000 INTRAVENOUS SOLUTION INTRAVENOUS ONCE
Status: COMPLETED | OUTPATIENT
Start: 2021-05-24 | End: 2021-05-24

## 2021-05-24 RX ORDER — ACETAMINOPHEN 650 MG/1
650 SUPPOSITORY RECTAL EVERY 6 HOURS PRN
Status: DISCONTINUED | OUTPATIENT
Start: 2021-05-24 | End: 2021-05-27 | Stop reason: HOSPADM

## 2021-05-24 RX ORDER — PROMETHAZINE HYDROCHLORIDE 12.5 MG/1
12.5 TABLET ORAL EVERY 6 HOURS PRN
Status: DISCONTINUED | OUTPATIENT
Start: 2021-05-24 | End: 2021-05-27 | Stop reason: HOSPADM

## 2021-05-24 RX ORDER — ONDANSETRON 2 MG/ML
4 INJECTION INTRAMUSCULAR; INTRAVENOUS EVERY 6 HOURS PRN
Status: DISCONTINUED | OUTPATIENT
Start: 2021-05-24 | End: 2021-05-27 | Stop reason: HOSPADM

## 2021-05-24 RX ORDER — SODIUM CHLORIDE 9 MG/ML
INJECTION, SOLUTION INTRAVENOUS CONTINUOUS
Status: DISCONTINUED | OUTPATIENT
Start: 2021-05-24 | End: 2021-05-25

## 2021-05-24 RX ORDER — SODIUM CHLORIDE 0.9 % (FLUSH) 0.9 %
5-40 SYRINGE (ML) INJECTION PRN
Status: DISCONTINUED | OUTPATIENT
Start: 2021-05-24 | End: 2021-05-27 | Stop reason: HOSPADM

## 2021-05-24 RX ORDER — SODIUM CHLORIDE 0.9 % (FLUSH) 0.9 %
5-40 SYRINGE (ML) INJECTION EVERY 12 HOURS SCHEDULED
Status: DISCONTINUED | OUTPATIENT
Start: 2021-05-24 | End: 2021-05-27 | Stop reason: HOSPADM

## 2021-05-24 RX ORDER — MULTIVIT-MIN/IRON/FOLIC ACID/K 18-600-40
1 CAPSULE ORAL DAILY
COMMUNITY

## 2021-05-24 RX ORDER — SODIUM CHLORIDE 9 MG/ML
25 INJECTION, SOLUTION INTRAVENOUS PRN
Status: DISCONTINUED | OUTPATIENT
Start: 2021-05-24 | End: 2021-05-27 | Stop reason: HOSPADM

## 2021-05-24 RX ORDER — FINASTERIDE 5 MG/1
5 TABLET, FILM COATED ORAL DAILY
COMMUNITY

## 2021-05-24 RX ORDER — POLYETHYLENE GLYCOL 3350 17 G/17G
17 POWDER, FOR SOLUTION ORAL DAILY PRN
Status: DISCONTINUED | OUTPATIENT
Start: 2021-05-24 | End: 2021-05-27 | Stop reason: HOSPADM

## 2021-05-24 RX ADMIN — Medication 500 MG: at 14:09

## 2021-05-24 RX ADMIN — SODIUM CHLORIDE: 9 INJECTION, SOLUTION INTRAVENOUS at 16:35

## 2021-05-24 RX ADMIN — SODIUM CHLORIDE 1000 ML: 9 INJECTION, SOLUTION INTRAVENOUS at 13:35

## 2021-05-24 RX ADMIN — CEFTRIAXONE 1000 MG: 1 INJECTION, POWDER, FOR SOLUTION INTRAMUSCULAR; INTRAVENOUS at 13:29

## 2021-05-24 RX ADMIN — ACETAMINOPHEN 650 MG: 325 TABLET ORAL at 16:44

## 2021-05-24 RX ADMIN — SODIUM CHLORIDE, PRESERVATIVE FREE 10 ML: 5 INJECTION INTRAVENOUS at 21:40

## 2021-05-24 RX ADMIN — ENOXAPARIN SODIUM 40 MG: 40 INJECTION SUBCUTANEOUS at 16:44

## 2021-05-24 ASSESSMENT — ENCOUNTER SYMPTOMS
SHORTNESS OF BREATH: 1
APNEA: 0
COUGH: 1
CONSTIPATION: 0
DIARRHEA: 0
ABDOMINAL PAIN: 0
NAUSEA: 0
CHOKING: 0
EYE DISCHARGE: 0
BLOOD IN STOOL: 0
SORE THROAT: 0
VOICE CHANGE: 0
SINUS PRESSURE: 0
FACIAL SWELLING: 0

## 2021-05-24 ASSESSMENT — PAIN SCALES - GENERAL: PAINLEVEL_OUTOF10: 0

## 2021-05-24 NOTE — H&P
Left Ear: External ear normal.      Nose: Nose normal. No congestion or rhinorrhea. Mouth/Throat:      Mouth: Mucous membranes are moist.      Pharynx: Oropharynx is clear. No oropharyngeal exudate or posterior oropharyngeal erythema. Eyes:      General: No scleral icterus. Right eye: No discharge. Left eye: No discharge. Extraocular Movements: Extraocular movements intact. Conjunctiva/sclera: Conjunctivae normal.      Pupils: Pupils are equal, round, and reactive to light. Neck:      Vascular: No carotid bruit. Cardiovascular:      Rate and Rhythm: Normal rate and regular rhythm. Pulses: Normal pulses. Heart sounds: Normal heart sounds. No murmur heard. No friction rub. No gallop. Pulmonary:      Effort: Pulmonary effort is normal. No respiratory distress. Breath sounds: No stridor. Rhonchi present. No wheezing. Chest:      Chest wall: No tenderness. Abdominal:      General: Bowel sounds are normal. There is no distension. Palpations: Abdomen is soft. Tenderness: There is no abdominal tenderness. There is no guarding or rebound. Musculoskeletal:         General: No swelling, tenderness, deformity or signs of injury. Normal range of motion. Cervical back: Normal range of motion and neck supple. No rigidity. No muscular tenderness. Right lower leg: No edema. Left lower leg: No edema. Skin:     General: Skin is warm and dry. Capillary Refill: Capillary refill takes less than 2 seconds. Coloration: Skin is not jaundiced or pale. Findings: No bruising, erythema, lesion or rash. Neurological:      General: No focal deficit present. Mental Status: He is alert and oriented to person, place, and time. Cranial Nerves: No cranial nerve deficit. Sensory: No sensory deficit. Motor: No weakness.       Coordination: Coordination normal.   Psychiatric:         Mood and Affect: Mood normal.         Behavior: Paresh Macy on 5/24/2021 12:41 PM          ECHOCARDIOGRAM :    pending             ASSESSMENT / IMPRESSION & PLAN:          Hospital Problems         Last Modified POA    Pneumonia 5/24/2021 Yes          Active Problems:    Pneumonia  Resolved Problems:    * No resolved hospital problems. *          Community-Acquired Pneumonia   Pneumonia, due to unspecified organism.  - Afebrile   - N Leukocytosis   - Initial Chest x-ray reporting \"Bilateral lung infiltrate may represent an inflammatory/infectious process. Further follow-up is recommended. · Follow-up CT chest without contrast   Procalcitonin level   - Blood culture    - Sputum culture   - Urine legionella antigen   - Urine strep antigen   - Ceftriaxone and Azithromycin   - Consider discontinuing atypical coverage (Azithromycin), pending Strep and Legionella Urine Antigen   - Oxygen supplementation to keep SPO2 > 92%   - Bronchodilators as needed   - Dextromethorphan/Guaifenesin for symptomatic relief   - Acetaminophen 650 mg PO Q6H/PRN for fever   - Continue to monitor    Elevated proBNP  · ProBNP of 3,682  · Normal renal function  · No documented history of CHF  · 2D echocardiogram      Continue management of other chronic medical conditions - Please see orders above         CONSULTS:    IP CONSULT TO HOSPITALIST        INPATIENT CHECKLIST:      Nutrition: DIET CARDIAC;    Prophylaxis Orders:   VTE - Enoxaparin     CODE STATUS: DNR-CC  ISOLATION:       DISCHARGE PLAN: tbd     Total face-to-face time spent with this patient, time spent reviewing medical records, and in coordination of care with the emergency department physician, nursing staff, in the examination, evaluation/assessment, counseling, review of medications and plan, was  50 mins .      Electronically signed by   Cay Kayser MD, MPH, MD  Internal Medicine Hospitalist   5/24/2021 4:09 PM      EMR Dragon/Transcription disclaimer:   Much of this encounter note is an electronic transcription/translation of spoken language to printed text.  The electronic translation of spoken language may permit erroneous, or at times, nonsensical words or phrases to be inadvertently transcribed; although attempts have made to review the note for such errors, some may still exist.

## 2021-05-24 NOTE — PROGRESS NOTES
4 Eyes Skin Assessment    Randy Rai is being assessed upon: Admission    I agree that I, Kylah Foss, TITA, along with Marjorie Barker RN (either 2 RN's or 1 LPN and 1 RN) have performed a thorough Head to Toe Skin Assessment on the patient. ALL assessment sites listed below have been assessed. Areas assessed by both nurses:     [x]   Head, Face, and Ears   [x]   Shoulders, Back, and Chest  [x]   Arms, Elbows, and Hands   [x]   Coccyx, Sacrum, and Ischium  [x]   Legs, Feet, and Heels    Does the Patient have Skin Breakdown?  No    Jefry Prevention initiated: NA  Wound Care Orders initiated: NA    WOC nurse consulted for Pressure Injury (Stage 3,4, Unstageable, DTI, NWPT, and Complex wounds) and New or Established Ostomies: NA        Primary Nurse eSignature: Kylah Foss RN on 5/24/2021 at 4:57 PM      Co-Signer eSignature: {Esignature:[id number]}

## 2021-05-24 NOTE — PLAN OF CARE
Problem: Falls - Risk of:  Goal: Will remain free from falls  Description: Will remain free from falls  Outcome: Ongoing  Goal: Absence of physical injury  Description: Absence of physical injury  Outcome: Ongoing     Problem: Airway Clearance - Ineffective:  Goal: Clear lung sounds  Description: Clear lung sounds  Outcome: Ongoing  Goal: Ability to maintain a clear airway will improve  Description: Ability to maintain a clear airway will improve  Outcome: Ongoing     Problem: Fluid Volume - Deficit:  Goal: Achieves intake and output within specified parameters  Description: Achieves intake and output within specified parameters  Outcome: Ongoing     Problem: Gas Exchange - Impaired:  Goal: Levels of oxygenation will improve  Description: Levels of oxygenation will improve  Outcome: Ongoing     Problem: Hyperthermia:  Goal: Ability to maintain a body temperature in the normal range will improve  Description: Ability to maintain a body temperature in the normal range will improve  Outcome: Ongoing

## 2021-05-24 NOTE — ED PROVIDER NOTES
[facility] EMERGENCY DEPT  eMERGENCY dEPARTMENT eNCOUnter      Pt Name: Mireille Levin  MRN: [id number]  [place] 6/15/1925  Date of evaluation: 5/24/2021  Provider: Randy Salcido MD    [address] [place] [address]       Chief Complaint   Patient presents with    Respiratory Distress     arrived via EMS from home with resp distress, not eating much, weakness         HISTORY OF PRESENT ILLNESS   (Location/Symptom, Timing/Onset,Context/Setting, Quality, Duration, Modifying Factors, Severity)  Note limiting factors. Mireille Levin is a 80 y.o. male who presents to the emergency department respiratory distress    44-year-old male brought in by his daughter with worsening failure to thrive and shortness of breath cough. No documented fever. He is a patient at the [facility] in relatively good health. He has had about a 25 pound weight loss and a gradual decline in the past 6 months. I am told several months ago or so ago he had a water leak and apparently he was involved and we are trying to repair it or diagnose it. Had a fall and bruised his right chest.  But did not seek medical attention. Since that time he has declined more rapidly. He has had his Covid vaccination. This information is from the daughter. The history is provided by the patient and a relative. NursingNotes were reviewed. REVIEW OF SYSTEMS    (2-9 systems for level 4, 10 or more for level 5)     Review of Systems   Constitutional: Positive for appetite change (Very much decreased), fatigue and unexpected weight change. Negative for chills and fever. HENT: Negative for congestion, drooling, facial swelling, nosebleeds, sinus pressure, sore throat and voice change. Eyes: Negative for discharge. Respiratory: Positive for cough and shortness of breath. Negative for apnea and choking. Cardiovascular: Negative for chest pain and leg swelling. Gastrointestinal: Negative for abdominal pain, blood in stool, constipation, diarrhea and nausea. Genitourinary: Negative for dysuria and enuresis. Musculoskeletal: Negative for joint swelling. Skin: Negative for rash and wound. Neurological: Negative for seizures and syncope. Psychiatric/Behavioral: Negative for behavioral problems, hallucinations and suicidal ideas. All other systems reviewed and are negative. A complete review of systems was performed and is negative except as noted above in the HPI.        PAST MEDICAL HISTORY     Past Medical History:   Diagnosis Date    Abnormal results of liver function studies     Abnormal weight loss     Alcohol dependence ([facility] [address]     Allergic rhinitis     Blood in stool     BPH (benign prostatic hypertrophy)     Hearing loss     History of colon polyps     Hyperlipidemia     Malignant melanoma of head and neck (HCC)     and face    Malignant melanoma of skin (HCC)     Melena     Senile dementia (HCC)          SURGICAL HISTORY       Past Surgical History:   Procedure Laterality Date    APPENDECTOMY      COLON SURGERY      resection    COLONOSCOPY  08/24/2015    Dr. Gaston Aldana:  AP    OTHER SURGICAL HISTORY  8/28/15    open low anterior sigmoid resection    [place] FEMUR/KNEE SURG UNLISTED Right 9/9/2018    RIGHT TFN performed by Kellie Barrios MD at [address]  10/07/2015    Dr. Daley Lung  08/24/2015    Dr. Gaston Aldana- normal         CURRENT MEDICATIONS       Previous Medications    CHOLESTYRAMINE (QUESTRAN) 4 G PACKET    Take 1 packet by mouth 2 times daily    COLESEVELAM (WELCHOL) 625 MG TABLET    Take 1 tablet by mouth 2x daily for diarrhea       ALLERGIES     Lipitor [atorvastatin] and Pravastatin    FAMILY HISTORY       Family History   Problem Relation Age of Onset    Cancer Mother     Breast Cancer Mother     Colon Cancer Neg Hx     Colon Polyps Neg Hx     Esophageal Cancer Neg Hx     Liver Cancer Neg Hx     Liver Disease Neg Hx     Rectal Cancer Neg Hx     Stomach Cancer Neg Hx           SOCIAL HISTORY       Social History     Socioeconomic History    Marital status:      Spouse name: None    Number of children: None    Years of education: None    Highest education level: None   Occupational History    None   Tobacco Use    Smoking status: Never Smoker    Smokeless tobacco: Never Used   Substance and Sexual Activity    Alcohol use: Yes     Alcohol/week: 21.0 standard drinks     Types: 14 Glasses of wine, 7 Cans of beer per week     Comment: 4 glasses of wine a day    Drug use: No    Sexual activity: None   Other Topics Concern    None   Social History Narrative    None     Social Determinants of Health     Financial Resource Strain:     Difficulty of Paying Living Expenses:    Food Insecurity:     Worried About Running Out of Food in the Last Year:     Ran Out of Food in the Last Year:    Transportation Needs:     Lack of Transportation (Medical):  Lack of Transportation (Non-Medical):    Physical Activity:     Days of Exercise per Week:     Minutes of Exercise per Session:    Stress:     Feeling of Stress :    Social Connections:     Frequency of Communication with Friends and Family:     Frequency of Social Gatherings with Friends and Family:     Attends Jainism Services:     Active Member of Clubs or Organizations:     Attends Club or Organization Meetings:     Marital Status:    Intimate Partner Violence:     Fear of Current or Ex-Partner:     Emotionally Abused:     Physically Abused:     Sexually Abused:        SCREENINGS    [place] Coma Scale  Eye Opening: Spontaneous  Best Verbal Response: Oriented  Best Motor Response: Obeys commands  Luis Enrique Coma Scale Score: 15        PHYSICAL EXAM    (up to 7 for level 4, 8 or more for level 5)     ED Triage Vitals [05/24/21 1201]   BP Temp Temp src Pulse Resp SpO2 Height Weight   100/68 98.7 °F (37.1 °C) -- 81 30 (!) 88 % -- --       Physical Exam  Vitals and nursing note reviewed.    Constitutional: General: He is not in acute distress. Appearance: He is well-developed. He is cachectic. Comments: He appears his stated age   HENT:      Head: Normocephalic and atraumatic. Right Ear: External ear normal.      Left Ear: External ear normal.      Nose: Nose normal.      Mouth/Throat:      Mouth: Mucous membranes are dry. Eyes:      General: No scleral icterus. Conjunctiva/sclera: Conjunctivae normal.      Pupils: Pupils are equal, round, and reactive to light. Cardiovascular:      Rate and Rhythm: Normal rate and regular rhythm. Heart sounds: Normal heart sounds. Pulmonary:      Effort: Pulmonary effort is normal.      Breath sounds: Rhonchi (Throughout) and rales (Throughout) present. Abdominal:      General: Bowel sounds are normal.      Palpations: Abdomen is soft. Tenderness: There is no abdominal tenderness. Musculoskeletal:         General: Normal range of motion. Cervical back: Normal range of motion and neck supple. Skin:     General: Skin is warm and dry. Coloration: Skin is not jaundiced or pale. Neurological:      General: No focal deficit present. Mental Status: He is alert and oriented to person, place, and time. Psychiatric:         Behavior: Behavior normal.         DIAGNOSTIC RESULTS     EKG: All EKG's are interpreted by the Emergency Department Physician who either signs or Co-signs this chart in the absence of a cardiologist.    Sinus rhythm. RADIOLOGY:   Non-plain film images such as CT, Ultrasound and MRI are read by the radiologist. Plainradiographic images are visualized and preliminarily interpreted by the emergency physician with the below findings:    I have reviewed the images and results. Interpretation per the Radiologist below, if available at the time of this note:    XR CHEST PORTABLE   Final Result   Bilateral lung infiltrate may represent an   inflammatory/infectious process. Further follow-up is recommended. respiratory failure with hypoxia (HCC)  Pneumonia due to organism  Diagnosis management comments: Bilateral infiltrates. Patient sat up to 96 on 4 L. Discussed living will with patient and daughter. And discussed the case with hospitalist service for admission. CONSULTS:  IP CONSULT TO HOSPITALIST    PROCEDURES:  Unless otherwise notedbelow, none     Procedures    FINAL IMPRESSION     1. Pneumonia due to organism    2.  Acute respiratory failure with hypoxia (HCC)          DISPOSITION/PLAN   DISPOSITION        PATIENT REFERRED TO:  @FUP@    DISCHARGE MEDICATIONS:  New Prescriptions    No medications on file          (Please note that portions of this note were completed with a voice recognition program.  Efforts were made to edit the dictations butoccasionally words are mis-transcribed.)    John Reyes MD (electronically signed)  AttendingEmergency Physician          Carolin Vragas MD  05/24/21 [address] [facility] [address]       Carolin Vargas MD  05/24/21 [id number]

## 2021-05-24 NOTE — ED NOTES
Bed: 07  Expected date:   Expected time:   Means of arrival:   Comments:  ems     Vannesa Mckeon RN  05/24/21 9632

## 2021-05-25 PROBLEM — E87.6 HYPOKALEMIA: Status: ACTIVE | Noted: 2021-05-25

## 2021-05-25 PROBLEM — Z51.5 PALLIATIVE CARE PATIENT: Status: ACTIVE | Noted: 2021-05-25

## 2021-05-25 PROBLEM — E43 SEVERE MALNUTRITION (HCC): Chronic | Status: ACTIVE | Noted: 2021-05-25

## 2021-05-25 LAB
ANION GAP SERPL CALCULATED.3IONS-SCNC: 7 MMOL/L (ref 7–19)
ATYPICAL LYMPHOCYTE RELATIVE PERCENT: 6 % (ref 0–8)
BANDED NEUTROPHILS RELATIVE PERCENT: 6 % (ref 0–5)
BASOPHILS ABSOLUTE: 0 K/UL (ref 0–0.2)
BASOPHILS RELATIVE PERCENT: 0 % (ref 0–1)
BUN BLDV-MCNC: 39 MG/DL (ref 8–23)
CALCIUM SERPL-MCNC: 8.1 MG/DL (ref 8.2–9.6)
CHLORIDE BLD-SCNC: 110 MMOL/L (ref 98–111)
CO2: 27 MMOL/L (ref 22–29)
CREAT SERPL-MCNC: 0.6 MG/DL (ref 0.5–1.2)
EOSINOPHILS ABSOLUTE: 0 K/UL (ref 0–0.6)
EOSINOPHILS RELATIVE PERCENT: 0 % (ref 0–5)
GFR AFRICAN AMERICAN: >59
GFR NON-AFRICAN AMERICAN: >60
GLUCOSE BLD-MCNC: 67 MG/DL (ref 70–99)
GLUCOSE BLD-MCNC: 74 MG/DL (ref 70–99)
GLUCOSE BLD-MCNC: 94 MG/DL (ref 74–109)
HCT VFR BLD CALC: 39.3 % (ref 42–52)
HEMOGLOBIN: 13.1 G/DL (ref 14–18)
IMMATURE GRANULOCYTES #: 0 K/UL
L. PNEUMOPHILA SEROGP 1 UR AG: NORMAL
LV EF: 45 %
LVEF MODALITY: NORMAL
LYMPHOCYTES ABSOLUTE: 1.9 K/UL (ref 1.1–4.5)
LYMPHOCYTES RELATIVE PERCENT: 32 % (ref 20–40)
MACROCYTES: ABNORMAL
MAGNESIUM: 2.2 MG/DL (ref 1.7–2.3)
MCH RBC QN AUTO: 33.7 PG (ref 27–31)
MCHC RBC AUTO-ENTMCNC: 33.3 G/DL (ref 33–37)
MCV RBC AUTO: 101 FL (ref 80–94)
MONOCYTES ABSOLUTE: 0 K/UL (ref 0–0.9)
MONOCYTES RELATIVE PERCENT: 1 % (ref 0–10)
NEUTROPHILS ABSOLUTE: 3 K/UL (ref 1.5–7.5)
NEUTROPHILS RELATIVE PERCENT: 55 % (ref 50–65)
OVALOCYTES: ABNORMAL
PDW BLD-RTO: 14 % (ref 11.5–14.5)
PERFORMED ON: ABNORMAL
PERFORMED ON: NORMAL
PLATELET # BLD: 136 K/UL (ref 130–400)
PLATELET SLIDE REVIEW: ABNORMAL
PMV BLD AUTO: 12 FL (ref 9.4–12.4)
POTASSIUM REFLEX MAGNESIUM: 3.3 MMOL/L (ref 3.5–5)
RBC # BLD: 3.89 M/UL (ref 4.7–6.1)
REASON FOR REJECTION: NORMAL
REJECTED TEST: NORMAL
SODIUM BLD-SCNC: 144 MMOL/L (ref 136–145)
STREP PNEUMONIAE ANTIGEN, URINE: NORMAL
TROPONIN: 0.06 NG/ML (ref 0–0.03)
WBC # BLD: 4.9 K/UL (ref 4.8–10.8)

## 2021-05-25 PROCEDURE — 99223 1ST HOSP IP/OBS HIGH 75: CPT | Performed by: INTERNAL MEDICINE

## 2021-05-25 PROCEDURE — 1210000000 HC MED SURG R&B

## 2021-05-25 PROCEDURE — 51702 INSERT TEMP BLADDER CATH: CPT

## 2021-05-25 PROCEDURE — 82947 ASSAY GLUCOSE BLOOD QUANT: CPT

## 2021-05-25 PROCEDURE — 85025 COMPLETE CBC W/AUTO DIFF WBC: CPT

## 2021-05-25 PROCEDURE — 93306 TTE W/DOPPLER COMPLETE: CPT

## 2021-05-25 PROCEDURE — 83735 ASSAY OF MAGNESIUM: CPT

## 2021-05-25 PROCEDURE — 36415 COLL VENOUS BLD VENIPUNCTURE: CPT

## 2021-05-25 PROCEDURE — 2700000000 HC OXYGEN THERAPY PER DAY

## 2021-05-25 PROCEDURE — 80048 BASIC METABOLIC PNL TOTAL CA: CPT

## 2021-05-25 PROCEDURE — 97116 GAIT TRAINING THERAPY: CPT

## 2021-05-25 PROCEDURE — 6360000002 HC RX W HCPCS: Performed by: INTERNAL MEDICINE

## 2021-05-25 PROCEDURE — 87449 NOS EACH ORGANISM AG IA: CPT

## 2021-05-25 PROCEDURE — 6360000002 HC RX W HCPCS

## 2021-05-25 PROCEDURE — 84484 ASSAY OF TROPONIN QUANT: CPT

## 2021-05-25 PROCEDURE — 97161 PT EVAL LOW COMPLEX 20 MIN: CPT

## 2021-05-25 PROCEDURE — 2580000003 HC RX 258: Performed by: INTERNAL MEDICINE

## 2021-05-25 RX ORDER — LORAZEPAM 2 MG/ML
0.5 INJECTION INTRAMUSCULAR ONCE
Status: COMPLETED | OUTPATIENT
Start: 2021-05-25 | End: 2021-05-25

## 2021-05-25 RX ORDER — LORAZEPAM 2 MG/ML
INJECTION INTRAMUSCULAR
Status: COMPLETED
Start: 2021-05-25 | End: 2021-05-25

## 2021-05-25 RX ADMIN — SODIUM CHLORIDE: 9 INJECTION, SOLUTION INTRAVENOUS at 08:58

## 2021-05-25 RX ADMIN — LORAZEPAM 0.5 MG: 2 INJECTION INTRAMUSCULAR at 16:05

## 2021-05-25 RX ADMIN — ENOXAPARIN SODIUM 40 MG: 40 INJECTION SUBCUTANEOUS at 08:58

## 2021-05-25 RX ADMIN — LORAZEPAM 0.5 MG: 2 INJECTION INTRAMUSCULAR; INTRAVENOUS at 16:05

## 2021-05-25 RX ADMIN — SODIUM CHLORIDE, PRESERVATIVE FREE 1000 MG: 5 INJECTION INTRAVENOUS at 14:55

## 2021-05-25 RX ADMIN — SODIUM CHLORIDE, PRESERVATIVE FREE 10 ML: 5 INJECTION INTRAVENOUS at 21:00

## 2021-05-25 ASSESSMENT — ENCOUNTER SYMPTOMS
SHORTNESS OF BREATH: 0
EYES NEGATIVE: 1
RESPIRATORY NEGATIVE: 1
VOMITING: 0
GASTROINTESTINAL NEGATIVE: 1
DIARRHEA: 0
NAUSEA: 0

## 2021-05-25 NOTE — CONSULTS
[facility]  Cardiology Consult      Requesting MD:  Rufina Solis MD   Admit Status:  Inpatient [101]       History obtained from:   [] Patient  [] Other (specify):     Patient:  Brenda Garrison  [id number]     Chief Complaint:   Chief Complaint   Patient presents with    Respiratory Distress     arrived via EMS from home with resp distress, not eating much, weakness         HPI: Mr. Stephanie Castellanos is a 80 y.o. male admitted 5/24/2021 with labored breathing shortness of breath associated cough nonbloody. No reported fever. Significant failure to thrive in recent months. His wife passed away a while ago. He is not been eating the last few months. Lost at least 25+ pounds and he has obvious supraclavicular wasting on examination. Lives by himself at home his daughter indicates he is lost all interest has very little social contact outside of his daughter. Extremely hard of hearing. He has had both of his Covid-19 vaccinations. proBNP elevated 3682. Echocardiogram today suggest ejection fraction 45% hypoxemic PO2 40 mm on initial ABGs. Chest x-ray bilateral infiltrates. No previous cardiac history or work-up does not describe anginal chest pain but extremely difficult to discuss with due to his poor hearing. Review of Systems:  Review of Systems   Constitutional: Negative. Negative for chills, fever and unexpected weight change. HENT: Negative. Eyes: Negative. Respiratory: Negative. Negative for shortness of breath. Cardiovascular: Negative. Negative for chest pain. Gastrointestinal: Negative. Negative for diarrhea, nausea and vomiting. Endocrine: Negative. Genitourinary: Negative. Musculoskeletal: Negative. Skin: Negative. Neurological: Negative. All other systems reviewed and are negative.       Cardiac Specific Data:  Specialty Problems     None          Past Medical History:  Past Medical History:   Diagnosis Date    Abnormal results of liver Paying Living Expenses:    Food Insecurity:     Worried About Running Out of Food in the Last Year:     [address] in the Last Year:    Transportation Needs:     Lack of Transportation (Medical):  Lack of Transportation (Non-Medical):    Physical Activity:     Days of Exercise per Week:     Minutes of Exercise per Session:    Stress:     Feeling of Stress :    Social Connections:     Frequency of Communication with Friends and Family:     Frequency of Social Gatherings with Friends and Family:     Attends Anabaptism Services:     Active Member of Clubs or Organizations:     Attends Club or Organization Meetings:     Marital Status:    Intimate Partner Violence:     Fear of Current or Ex-Partner:     Emotionally Abused:     Physically Abused:     Sexually Abused: Allergies: Allergies   Allergen Reactions    Lipitor [Atorvastatin]     Pravastatin        Home Meds:  Prior to Admission medications    Medication Sig Start Date End Date Taking? Authorizing Provider   Cholecalciferol (VITAMIN D) 50 MCG (2000 UT) CAPS capsule Take 1 capsule by mouth daily   Yes Historical Provider, MD   finasteride (PROSCAR) 5 MG tablet Take 5 mg by mouth daily   Yes Historical Provider, MD       Current Meds:   sodium chloride flush  5-40 mL Intravenous 2 times per day    enoxaparin  40 mg Subcutaneous Daily    cefTRIAXone (ROCEPHIN) IV  1,000 mg Intravenous Q24H       Current Infused Meds:   sodium chloride 75 mL/hr at 05/25/21 0858    sodium chloride         Physical Exam:  Vitals:    05/25/21 1228   BP: 108/73   Pulse: 66   Resp: 17   Temp: 97.1 °F (36.2 °C)   SpO2: 94%       Intake/Output Summary (Last 24 hours) at 5/25/2021 1306  Last data filed at 5/25/2021 0800  Gross per 24 hour   Intake 120 ml   Output 350 ml   Net -230 ml     Estimated body mass index is 16.61 kg/m² as calculated from the following:    Height as of this encounter: 5' 9\" (1.753 m).     Weight as of this encounter: 112 lb 8 oz (51 kg). Physical Exam  Vitals reviewed. Constitutional:       General: He is not in acute distress. Appearance: He is well-developed. He is ill-appearing. He is not toxic-appearing or diaphoretic. Comments: Frail elderly white male somewhat cachectic and emaciated appearing obvious supraclavicular wasting  Extremely hard of hearing   HENT:      Head: Normocephalic and atraumatic. Nose: Nose normal.      Mouth/Throat:      Mouth: Mucous membranes are moist.      Pharynx: Oropharynx is clear. Eyes:      General: No scleral icterus. Extraocular Movements: Extraocular movements intact. Pupils: Pupils are equal, round, and reactive to light. Neck:      Vascular: No carotid bruit or JVD. Cardiovascular:      Rate and Rhythm: Normal rate and regular rhythm. Heart sounds: Normal heart sounds. No murmur heard. No friction rub. No gallop. Pulmonary:      Effort: Pulmonary effort is normal. No respiratory distress. Breath sounds: Normal breath sounds. No wheezing or rales. Abdominal:      General: Abdomen is flat. Bowel sounds are normal. There is no distension. Palpations: Abdomen is soft. There is no mass. Tenderness: There is no abdominal tenderness. There is no right CVA tenderness, left CVA tenderness, guarding or rebound. Hernia: No hernia is present. Musculoskeletal:         General: No swelling, tenderness, deformity or signs of injury. Cervical back: Normal range of motion and neck supple. No rigidity or tenderness. Right lower leg: No edema. Left lower leg: No edema. Lymphadenopathy:      Cervical: No cervical adenopathy. Skin:     General: Skin is warm and dry. Neurological:      General: No focal deficit present. Mental Status: He is alert and oriented to person, place, and time. Mental status is at baseline. Cranial Nerves: No cranial nerve deficit. Sensory: No sensory deficit. Motor: No weakness. 3.4 cm lipoma projects over the upper left chest just medial to the scapular border. 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 Upper abdomen: The imaged portion of the upper abdomen demonstrates no acute process. No evidence of free air within the upper abdomen. 1. Bilateral lower lobe and right middle lobe pneumonia is present. There is rather dense consolidation within the lower lobes. Trace bilateral pleural effusions are present. 2. Mild cardiomegaly with coronary calcifications. The thoracic aorta and proximal great vessels are remarkable for atheromatous calcification without evidence of aneurysm. 3. Lipoma projecting over the upper posterior left chest near the medial scapular border. There is advanced arthritic change of the glenohumeral joints. 4. Compression deformities are noted at T3 and T7 felt to be chronic in nature. There is an accentuated thoracic kyphosis. . Signed by Dr Lasha Arteaga on 5/25/2021 12:15 AM    XR CHEST PORTABLE    Result Date: 5/24/2021  Examination. XR CHEST PORTABLE 5/24/2021 11:08 AM History: Abnormal chest x-ray at an outside facility. A single frontal portable upright view of the chest is compared with the previous study dated 9/8/2018. There is bilateral lower lung and parahilar infiltrate which was not seen in the previous study. There is no pleural effusion, pulmonary congestion or pneumothorax. The heart size is not evaluated due to the portable projection. The atheromatous changes of thoracic aorta are noted. There is moderate diffuse osteopenia. There is no acute bony abnormality. Bilateral lung infiltrate may represent an inflammatory/infectious process. Further follow-up is recommended.  Signed by Dr Verner Spell on 5/24/2021 12:41 PM    ECHO 2D WO COLOR DOPPLER COMPLETE    Result Date: 5/25/2021  Transthoracic Echocardiography Report (TTE)  Demographics   Patient Name   Sheila Cage Date of Study           05/25/2021   MRN            [id number]         Gender                  Male   Date of Birth  06/15/1925     Room Number             MHL-0430   Age            89 year(s)   Height:        69 inches      Referring Physician     Darvin Colbert MD   Weight:        112 pounds     Sonographer             Letitia Woods   BSA:           1.61 m^2       Interpreting Physician  Sera Jean Baptiste MD   BMI:           16.54 kg/m^2  Procedure Type of Study   TTE procedure:ECHOCARDIOGRAM COMPLETE 2D WO COLOR DOPPLER. Study Location: Echo Lab Technical Quality: Limited visualization due to lung interface. Patient Status: Inpatient Rhythm: Within normal limits Indications:Dyspnea/SOB and Elevated BNP. Conclusions   Summary  Mitral valve leaflets are mildly thickened with preserved leaflet  mobility. Mild mitral regurgitation is present. Mildly thickened aortic valve leaflets with preserved leaflet mobility. Mild aortic regurgitation is noted. Tricuspid valve is structurally normal.  Mild to moderate tricuspid regurgitation with estimated RVSP of 21 mm Hg. Left ventricular ejection fraction is visually estimated at 45%. Mild concentric left ventricular hypertrophy. Impaired relaxation compatible with diastolic dysfunction. ( reversed E/A  ratio)  No regional wall motion abnormalities. Signature   ----------------------------------------------------------------  Electronically signed by Sera Jean Baptiste MD(Interpreting  physician) on 05/25/2021 08:30 AM  ----------------------------------------------------------------   Findings   Mitral Valve  Mitral valve leaflets are mildly thickened with preserved leaflet  mobility. Mild mitral regurgitation is present. Aortic Valve  Mildly thickened aortic valve leaflets with preserved leaflet mobility. Mild aortic regurgitation is noted. Tricuspid Valve  Tricuspid valve is structurally normal.  Mild to moderate tricuspid regurgitation with estimated RVSP of 21 mm Hg. Pulmonic Valve  The pulmonic valve was not well visualized . Left Atrium  Normal size left atrium. Left Ventricle  Left ventricular ejection fraction is visually estimated at 45%. Mild concentric left ventricular hypertrophy. Impaired relaxation compatible with diastolic dysfunction. ( reversed E/A  ratio)  No regional wall motion abnormalities. Right Atrium  Normal right atrial dimension with no evidence of thrombus or mass noted. Right Ventricle  Normal right ventricular size with preserved RV function. Pericardial Effusion  No evidence of significant pericardial effusion is noted. Pleural Effusion  No evidence of pleural effusion. Miscellaneous  Technically difficult study  M-Mode Measurements (cm)   LVIDd: 3.4 cm                          LVIDs: 2.66 cm  IVSd: 1.26 cm  LVPWd: 1.02 cm                         AO Root Dimension: 3 cm  % Ejection Fraction: 45 %              LA: 2.9 cm                                         LVOT: 2 cm  Doppler Measurements:   AV Peak Velocity:66 cm/s             MV Peak E-Wave: 42.8 cm/s  AV Peak Gradient: 1.74 mmHg          MV Peak A-Wave: 55.7 cm/s  AV Mean Gradient: 1 mmHg             MV E/A Ratio: 0.77 %  AV Area (Continuity):3.14 cm^2       MV Peak Gradient: 0.73 mmHg  TR Velocity:217 cm/s  TR Gradient:18.84 mmHg  Estimated RAP:3 mmHg  RVSP:21 mmHg        Assessment:  1. Labored breathing/shortness of breath suspect secondary to a combination of congestive heart failure and pneumonia  2. Significant weight loss in recent months clinical appearance of severe malnutrition  3. Bilateral pneumonia on chest x-ray  4. Colon polyps  5. Previous colon resection  6. Previous right hip fracture  7. History of alcohol usage  8. Microscopic hematuria  9.  CT of the chest bilateral lower lobe and right middle lobe pneumonia present rather dense consolidation lower lobes trace bilateral pleural effusions mild cardiomegaly with coronary calcifications lipoma over the upper posterior left chest near the medial scapular border advanced arthritic changes glenohumeral joints compression deformities T3 and T7 felt to be chronic in nature accentuated thoracic kyphosis  10. CT of the head 8/26/2019 moderate cerebral and cerebellar volume loss with chronic microvascular disease no evidence of acute findings soft tissue scalp laceration and hematoma in the left frontal region  11. Echocardiogram today mild MR aortic valve sclerosis mild AR mild to moderate TR RVSP 21 EF 45% mild concentric LVH      Recommendations:  1. Consider low-dose ACE inhibitor usage such as lisinopril 5 mg daily  2.  Diuretics as needed

## 2021-05-25 NOTE — PROGRESS NOTES
[facility] Hospital[facility] Progress Note    Patient:  Jadon Jorge  YOB: 1925  Date of Service: 5/25/2021  MRN: [id number]   Acct: [de-identified]   Primary Care Physician: Kat Eller  Advance Directive: DNR-CC  Admit Date: 5/24/2021       Hospital Day: 1        CHIEF COMPLAINT:   Shortness of breath and cough. Chief Complaint   Patient presents with    Respiratory Distress     arrived via EMS from home with resp distress, not eating much, weakness       5/25/2021 3:17 PM  Subjective / Interval History:   05/25/2021  Patient seen and examined this AM.  Doing well. No new complaints. Sitting comfortably in chair no acute distress. No acute changes or acute overnight event reported. Shortness of breath improved. Continues to cough, with intermittent relief. Patient more awake and alert this a.m. Family (daughter) at bedside. Review of Systems:   Review of Systems  ROS: 14 point review of systems is negative except as specifically addressed above. DIET CARDIAC;  Dental Soft; Mildly Thick (Nectar)  Dietary Nutrition Supplements: Frozen Oral Supplement, Other Oral Supplement (see comment)    Intake/Output Summary (Last 24 hours) at 5/25/2021 1517  Last data filed at 5/25/2021 1300  Gross per 24 hour   Intake 360 ml   Output 350 ml   Net 10 ml       Medications:   sodium chloride       Current Facility-Administered Medications   Medication Dose Route Frequency Provider Last Rate Last Admin    sodium chloride flush 0.9 % injection 5-40 mL  5-40 mL Intravenous 2 times per day Franco Velasco MD   10 mL at 05/24/21 2140    sodium chloride flush 0.9 % injection 5-40 mL  5-40 mL Intravenous PRN Franco Velasco MD        0.9 % sodium chloride infusion  25 mL Intravenous PRN Franco Velasco MD        enoxaparin (LOVENOX) injection 40 mg  40 mg Subcutaneous Daily Franco Velasco MD   40 mg at 05/25/21 0858    promethazine (PHENERGAN) tablet 12.5 mg  12.5 mg Oral Q6H PRN Fernanda Kaiser MD Rhoda        Or    ondansetron (ZOFRAN) injection 4 mg  4 mg Intravenous Q6H PRN Bharati Joshua MD        polyethylene glycol (GLYCOLAX) packet 17 g  17 g Oral Daily PRN Bharati Joshua MD        acetaminophen (TYLENOL) tablet 650 mg  650 mg Oral Q6H PRN Bharati Joshua MD   650 mg at 05/24/21 1644    Or    acetaminophen (TYLENOL) suppository 650 mg  650 mg Rectal Q6H PRN Bharati Joshua MD        cefTRIAXone (ROCEPHIN) 1,000 mg in sodium chloride (PF) 10 mL IV syringe  1,000 mg Intravenous Q24H Bharati Joshua MD   1,000 mg at 05/25/21 1455         sodium chloride        sodium chloride flush  5-40 mL Intravenous 2 times per day    enoxaparin  40 mg Subcutaneous Daily    cefTRIAXone (ROCEPHIN) IV  1,000 mg Intravenous Q24H     sodium chloride flush, sodium chloride, promethazine **OR** ondansetron, polyethylene glycol, acetaminophen **OR** acetaminophen  DIET CARDIAC;  Dental Soft; Mildly Thick (Nectar)  Dietary Nutrition Supplements: Frozen Oral Supplement, Other Oral Supplement (see comment)       Labs:   CBC with DIFF:  Recent Labs     05/24/21  1211 05/25/21  0509   WBC 4.5* 4.9   RBC 4.29* 3.89*   HGB 14.6 13.1*   HCT 42.7 39.3*   MCV 99.5* 101.0*   MCH 34.0* 33.7*   MCHC 34.2 33.3   RDW 14.0 14.0    136   MPV 11.7 12.0   NEUTOPHILPCT 79.9* 55.0   LYMPHOPCT 12.4* 32.0   MONOPCT 6.9 1.0   EOSRELPCT 0.0 0.0   BASOPCT 0.4 0.0   NEUTROABS 3.6 3.0   LYMPHSABS 0.6* 1.9   MONOSABS 0.30 0.00   EOSABS 0.00 0.00   BASOSABS 0.00 0.00       CMP/BMP:  Recent Labs     05/24/21  1211 05/24/21  1225 05/25/21  0633     --  144   K 3.9 3.4 3.3*     --  110   CO2 27  --  27   ANIONGAP 9  --  7   GLUCOSE 117*  --  94   BUN 44*  --  39*   CREATININE 0.7  --  0.6   LABGLOM >60  --  >60   CALCIUM 9.0  --  8.1*   PROT 6.0*  --   --    LABALBU 2.7*  --   --    BILITOT 1.0  --   --    ALKPHOS 55  --   --    ALT 11  --   --    AST 23  --   --          CRP:    Recent Labs 05/24/21 1802   CRP 9.85*     Sed Rate:    Recent Labs     05/24/21 1802   SEDRATE 31*         HgBA1c:  No components found for: HGBA1C  FLP:  No results found for: TRIG, HDL, LDLCALC, LDLDIRECT, LABVLDL  TSH:  No results found for: TSH  Troponin T:   Recent Labs     05/25/21  0849   TROPONINI 0.06*     Pro-BNP: No results for input(s): BNP in the last 72 hours. INR: No results for input(s): INR in the last 72 hours. ABGs:   Lab Results   Component Value Date    PHART 7.510 05/24/2021    PO2ART 40.0 05/24/2021    BPM3BHJ 33.0 05/24/2021     UA:No results for input(s): NITRITE, COLORU, PHUR, LABCAST, WBCUA, RBCUA, MUCUS, TRICHOMONAS, YEAST, BACTERIA, CLARITYU, SPECGRAV, LEUKOCYTESUR, UROBILINOGEN, BILIRUBINUR, BLOODU, GLUCOSEU, AMORPHOUS in the last 72 hours. Invalid input(s): Filemon Clear      Culture Results:    No results for input(s): CXSURG in the last 720 hours. Blood Culture Recent:   Recent Labs     05/24/21  1320   BC No Growth to date. Any change in status will be called. Cultures:   No results for input(s): CULTURE in the last 72 hours. Recent Labs     05/24/21  1320 05/24/21  1327   BC No Growth to date. Any change in status will be called. --    BLOODCULT2  --  No Growth to date. Any change in status will be called. No results for input(s): CXSURG in the last 72 hours. Recent Labs     05/25/21  0633   MG 2.2     Recent Labs     05/24/21  1211   AST 23   ALT 11   BILITOT 1.0   ALKPHOS 55         RAD:   CT CHEST WO CONTRAST    Result Date: 5/25/2021  CT CHEST WO CONTRAST 5/24/2021 5:49 PM HISTORY: Shortness of air and hypoxia. COMPARISON: None DLP: 268 mGy cm. Automated exposure control was utilized to diminish patient radiation dose. TECHNIQUE: Serial helical tomographic images of the chest were acquired. Bone and soft tissue algorithms were provided. Coronal reformatted images were also provided for review. FINDINGS: Neck base:  The imaged portion of the neck and thyroid gland is upright view of the chest is compared with the previous study dated 9/8/2018. There is bilateral lower lung and parahilar infiltrate which was not seen in the previous study. There is no pleural effusion, pulmonary congestion or pneumothorax. The heart size is not evaluated due to the portable projection. The atheromatous changes of thoracic aorta are noted. There is moderate diffuse osteopenia. There is no acute bony abnormality. Bilateral lung infiltrate may represent an inflammatory/infectious process. Further follow-up is recommended. Signed by Dr Veronica Askew on 5/24/2021 12:41 PM      Echo: 05/24/2021  Summary   Mitral valve leaflets are mildly thickened with preserved leaflet   mobility. Mild mitral regurgitation is present. Mildly thickened aortic valve leaflets with preserved leaflet mobility. Mild aortic regurgitation is noted. Tricuspid valve is structurally normal.   Mild to moderate tricuspid regurgitation with estimated RVSP of 21 mm Hg. Left ventricular ejection fraction is visually estimated at 45%. Mild concentric left ventricular hypertrophy. Impaired relaxation compatible with diastolic dysfunction. ( reversed E/A   ratio)   No regional wall motion abnormalities.       Signature      ----------------------------------------------------------------   Electronically signed by Sandra Louis MD(Interpreting   physician) on 05/25/2021 08:30 AM   ----------------------------------------------------------------           Objective:   Vitals:   /73   Pulse 66   Temp 97.1 °F (36.2 °C) (Temporal)   Resp 17   Ht 5' 9\" (1.753 m)   Wt 112 lb 8 oz (51 kg)   SpO2 94%   BMI 16.61 kg/m²     Patient Vitals for the past 24 hrs:   BP Temp Temp src Pulse Resp SpO2 Height Weight   05/25/21 1228 108/73 97.1 °F (36.2 °C) Temporal 66 17 94 % -- --   05/25/21 0741 118/72 97.1 °F (36.2 °C) Temporal 63 18 93 % -- --   05/24/21 0733 (!) 90/53 97.3 °F (36.3 °C) Temporal 59 20 92 % -- -- 05/24/21 2018 (!) 96/59 97 °F (36.1 °C) Temporal 64 20 91 % 5' 9\" (1.753 m) 112 lb 8 oz (51 kg)   05/24/21 1600 (!) 98/53 100 °F (37.8 °C) Temporal 64 20 95 % -- --   05/24/21 1559 (!) 98/53 100 °F (37.8 °C) Temporal 71 20 95 % -- --       24HR INTAKE/OUTPUT:      Intake/Output Summary (Last 24 hours) at 5/25/2021 1517  Last data filed at 5/25/2021 1300  Gross per 24 hour   Intake 360 ml   Output 350 ml   Net 10 ml       Physical Exam  Vitals and nursing note reviewed. Constitutional:       General: He is not in acute distress. Appearance: Normal appearance. He is not toxic-appearing or diaphoretic. Comments: Cachectic   HENT:      Head: Normocephalic and atraumatic. Comments: Decreased hearing     Right Ear: External ear normal.      Left Ear: External ear normal.      Nose: Nose normal. No congestion or rhinorrhea. Mouth/Throat:      Mouth: Mucous membranes are moist.      Pharynx: Oropharynx is clear. No oropharyngeal exudate or posterior oropharyngeal erythema. Eyes:      General: No scleral icterus. Right eye: No discharge. Left eye: No discharge. Extraocular Movements: Extraocular movements intact. Conjunctiva/sclera: Conjunctivae normal.      Pupils: Pupils are equal, round, and reactive to light. Neck:      Vascular: No carotid bruit. Cardiovascular:      Rate and Rhythm: Normal rate and regular rhythm. Pulses: Normal pulses. Heart sounds: Normal heart sounds. No murmur heard. No friction rub. No gallop. Pulmonary:      Effort: Pulmonary effort is normal. No respiratory distress. Breath sounds: Normal breath sounds. No stridor. No wheezing, rhonchi or rales. Chest:      Chest wall: No tenderness. Abdominal:      General: Bowel sounds are normal. There is no distension. Palpations: Abdomen is soft. Tenderness: There is no abdominal tenderness. There is no guarding or rebound.    Musculoskeletal:         General: No swelling, tenderness, deformity or signs of injury. Normal range of motion. Cervical back: Normal range of motion and neck supple. No rigidity. No muscular tenderness. Right lower leg: No edema. Left lower leg: No edema. Skin:     General: Skin is warm and dry. Capillary Refill: Capillary refill takes less than 2 seconds. Coloration: Skin is not jaundiced or pale. Findings: No bruising, erythema, lesion or rash. Neurological:      General: No focal deficit present. Mental Status: He is alert and oriented to person, place, and time. Cranial Nerves: No cranial nerve deficit. Sensory: No sensory deficit. Motor: No weakness. Coordination: Coordination normal.   Psychiatric:         Mood and Affect: Mood normal.         Behavior: Behavior normal.         Thought Content: Thought content normal.         Judgment: Judgment normal.         Assessment/plan:         Hospital Problems         Last Modified POA    Pneumonia 5/24/2021 Yes    Palliative care patient 5/25/2021 Yes    Severe malnutrition (HCC) (Chronic) 5/25/2021 Yes    Hypokalemia 5/25/2021 Yes          Active Problems:    Pneumonia    Palliative care patient    Severe malnutrition ([address]    Hypokalemia  Resolved Problems:    * No resolved hospital problems. *        Brief Summary  Mr. Joshua Simmons, a 80 y.o. male with a history of HLD, presenting to [facility] ED (05/24/2021), via EMS on account of moderate to severe shortness of breath, with associated nonbloody productive cough and failure to thrive. Reportedly not eating and with gradual worsening generalized weakness. Patient has reportedly been declining in the past 6 months, with a reported 25 pound weight loss.     Patient has had both of his COVID-19 vaccinations.     Initial work-up in the ED significant for  Lactic acid of 2.0  Elevated proBNP 3,682  Hypoxemic, with a PaO2 of 40 mmHg on initial ABG.   Respiratory PCR panel with COVID-19 negative  Initial chest x-ray reporting bilateral lung infiltrates.     Patient admitted for further work-up and management.       Community-Acquired Pneumonia   Bilateral Pneumonia, due to unspecified organism. · - Afebrile  · - N Leukocytosis  · - Initial Chest x-ray reporting \"Bilateral lung infiltrate may represent an inflammatory/infectious process. Further follow-up is recommended. · CT chest without contrast: (05/24/2021): Impression: Bilateral lower lobe and right middle lobe pneumonia is present. There is rather dense consolidation within the lower lobes. Trace bilateral pleural effusions are present. Mild cardiomegaly with coronary calcifications. The thoracic aorta  and proximal great vessels are remarkable for atheromatous calcification without evidence of aneurysm. Lipoma projecting over the upper posterior left chest near the medial scapular border. There is advanced arthritic change of the glenohumeral joints. Compression deformities are noted at T3 and T7 felt to be chronic in nature. There is an accentuated thoracic kyphosis. .  · Elevated Procalcitonin level: 0.79   · - Blood culture pending  · - Sputum culture  · - Urine strep and legionella antigen negative  · -Initially started on Ceftriaxone and Azithromycin  · Discontinue atypical coverage (Azithromycin), given Strep and Legionella Urine Antigen  · - Oxygen supplementation to keep SPO2 > 92%  · - Bronchodilators as needed  · - Dextromethorphan/Guaifenesin for symptomatic relief  · - Acetaminophen 650 mg PO Q6H/PRN for fever  · - Continue to monitor     ? New onset Combined Diastolic & Systolic HF, with acute Exacerbation  Elevated proBNP  NSTEMI  · ProBNP of 3,682  · Normal renal function  · No documented history of CHF  · 2D echocardiogram - 05/24/2021: With an estimated EF of 69% & a Diastolic dysfunction.   · Troponin 0.06  · Cardiology consult for further evaluation of possible new onset CHF     Hypokalemia  · Replace as per protocol      Continue management of other chronic medical conditions - see above and orders. Advance Directive: DNR-CC    DIET CARDIAC; Dental Soft; Mildly Thick (Nectar)  Dietary Nutrition Supplements: Frozen Oral Supplement, Other Oral Supplement (see comment)         Consults Made:   IP CONSULT TO DIETITIAN  PALLIATIVE CARE EVAL  IP CONSULT TO CARDIOLOGY    DVT prophylaxis: Enoxaparin    Discharge planning:  Continue current management, including oxygen supplementation, IV antibiotics for bilateral pneumonia. Cardiology consulted for new onset CHF as well as NSTEMI. Time Spent is 25 mins in the examination, evaluation, counseling and review of medications, assessment and plan.      Electronically signed by   Nirmal Rojo MD, MPH, MD,   Internal Medicine Hospitalist   5/25/2021 3:17 PM

## 2021-05-25 NOTE — PROGRESS NOTES
Physical Therapy    Facility/Department: [facility] ONCOLOGY UNIT  Initial Assessment    NAME: Ralf Donato  : 6/15/1925  MRN: [id number]    Date of Service: 2021    Discharge Recommendations:  Continue to assess pending progress, Patient would benefit from continued therapy after discharge        Assessment   Body structures, Functions, Activity limitations: Decreased functional mobility ; Decreased endurance;Decreased balance;Decreased posture;Decreased safe awareness;Decreased cognition  Assessment: pt WOULD BENEFIT FROM SKILLED PT IN THIS SETTING TO ADDRESS HIS MOBILITY/ENDURANCE DEFICITS  Prognosis: Good  Decision Making: Low Complexity  PT Education: General Safety;Gait Training;Functional Mobility Training;Transfer Training  REQUIRES PT FOLLOW UP: Yes  Activity Tolerance  Activity Tolerance: Patient Tolerated treatment well       Patient Diagnosis(es): The primary encounter diagnosis was Pneumonia due to organism. A diagnosis of Acute respiratory failure with hypoxia (HCC) was also pertinent to this visit. has a past medical history of Abnormal results of liver function studies, Abnormal weight loss, Alcohol dependence (HCC), Allergic rhinitis, Blood in stool, BPH (benign prostatic hypertrophy), Hearing loss, History of colon polyps, Hyperlipidemia, Malignant melanoma of head and neck ([address] Malignant melanoma of skin ([address] Melena, Palliative care patient, and Senile dementia ([address] has a past surgical history that includes other surgical history (8/28/15); Appendectomy; Colon surgery; Colonoscopy (2015); Upper gastrointestinal endoscopy (2015); Prostate surgery (10/07/2015); and pr femur/knee surg unlisted (Right, 2018).     Restrictions     Vision/Hearing  Hearing: Exceptions to [facility]  Hearing Exceptions: Hard of hearing/hearing concerns     Subjective  General  Diagnosis: PNA  Follows Commands: Impaired  Other (Comment): [place] AND DIFFICULTY PROCESSING COMMANDS  Subjective  Subjective: PALLIATIVE CARE SPEAKING WITH DTR AND DEVELOPING PLAN FOR D/C TO SNF WITH HOSPICE  Pain Screening  Patient Currently in Pain: Denies  Vital Signs  Patient Currently in Pain: Denies       Orientation  Orientation  Overall Orientation Status: Impaired  Social/Functional History  Social/Functional History  Additional Comments: pt HAS BEEN LIVING ALONE AND IND UNTIL THE LAST FEW WEEKS.   Cognition        Objective          AROM RLE (degrees)  RLE AROM: WFL  AROM LLE (degrees)  LLE AROM : WFL  Strength Other  Other: ANTIGRAVITY BILAT LE'S        Bed mobility  Rolling to Left: Minimal assistance  Supine to Sit: Minimal assistance  Transfers  Sit to Stand: Minimal Assistance  Stand to sit: Minimal Assistance  Bed to Chair: Minimal assistance  Ambulation  Ambulation?: Yes  Ambulation 1  Surface: level tile  Device: Rolling Walker  Other Apparatus: O2  Assistance: Minimal assistance  Gait Deviations: Slow Hermila;Decreased step length  Distance: TOOK STEPS FROM RECLINER<>BED              Plan   Plan  Times per week: 5-7  Plan weeks: 2  Current Treatment Recommendations: Safety Education & Training, Functional Mobility Training, Transfer Training, Gait Training, Cognitive Reorientation, Patient/Caregiver Education & Training  Plan Comment: 02  Safety Devices  Type of devices: Call light within reach, Gait belt, Chair alarm in place, Left in chair         Goals  Short term goals  Time Frame for Short term goals: 2 WKS  Short term goal 1: SUP<>SIT, SBA  Short term goal 2: SIT<>STAND, CGA  Short term goal 3:  FT WITH RW, CGA       Therapy Time   Individual Concurrent Group Co-treatment   Time In           Time Out           Minutes                   Chidi Sánchez PT     Electronically signed by Chidi Sánchez PT on 5/25/2021 at 11:44 AM

## 2021-05-25 NOTE — PROGRESS NOTES
Bladder scan read 445 ml. Brewer inserted per MD orders. 350 ml of monica colored urine emptied from catheter bag. Patient tolerated well.  Electronically signed by Ken Mcneil RN on 5/25/2021 at 4:59 AM

## 2021-05-25 NOTE — CONSULTS
Palliative Care: Pt is new to palliative care team.  Presents to ED with his dtr(primary CG/POA) with c/o resp dist.Found to have PNA and worsening FTT. Pt is in bed, dtr at bedside. PT here to work with pt. Pt needed coaching to follow directions from PT, however, did well getting up and trans to chair(see PT note). Past Medical History:        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)     and face    Malignant melanoma of skin ([facility] [address] [facility]     Melena     Palliative care patient 05/25/2021    Senile dementia [facility])        Advance Directives:  DNR-CC ACP complete with Cara JACOBSON. Pain/Other Symptoms:    Pt appears comfortable. Activity:    As brian/needs assist         Psychological/Spiritual:  Pt lives alone. Dtr prepares meals and sees him daily. She tells me pt is \"very spiritual\" but has not attended [employer]. Plan:    PT/OT eval and treat, Cardiology Cons(seen), ABX tx completed in ED    Patient/family discussion r/t goals:  Pt dtr, Cara Lozano, provides information about pt current condition as well as reviews his health as far back as 5 months ago. She reports pt has always been very strong and active. She tells me that 5 mos ago he was on a ladder cleaning his gutters( we did discuss the potential danger of pt being on a ladder), she voices understanding, however, pt lives alone. Dtr tells me she has been preparing his food and storing it in the refrigerator. She states pt is able to warm it in microwave, do some laundry, up until about a mo ago. She reports a more decreased appetite(he has not been eating food she brings).  She also tells me she has been preparing a more liquid and soft diet for him(due to his teeth and noticeable coughing/choking)  but he is still not eating  Family reports pt refuses to use a walker at home, but has been using his chairs with coasters to ambulate from one room to another. Discussion with dtr that pt may need more care than she can provide at this time(she states her  is sick and she has a difficult time keeping an eye on both of them). Pt fx'd his femur approx 2 yrs ago. A stephany was placed and he went to  for skilled care. She tells me he did very well after surgery and rehab. Review of any needed services:   Discussion about possible hospice care at home and/or facility. Explained pt will need 24 hr care if she chooses to take him home with [facility] services should he qualify.           Electronically signed by Karen Francois RN on 5/25/2021 at 11:49 AM

## 2021-05-25 NOTE — CONSULTS
Comprehensive Nutrition Assessment    Type and Reason for Visit:  Initial, Consult    Nutrition Recommendations/Plan: Add dental soft to diet order; initiate Magic cup and boost pudding ONS. Nutrition Assessment:  Consult for wt loss and poor intake recieved. Pt meets criteria for Severe Malnutrition AEB severe muscle/fat wasting, poor po intake, and significant wt loss of 16% in 9 months. Pt difficult to obtain hx from d/t [facility], son present and provides some preferences and hx. Son states pt typically obtains his calories from alcohol, states he has \"drank from 6:30/7 am to bedtime for years, however has slowed down in the past month. \" Will add dental soft to diet order as pt has few teeth and initiate Magic cup and Boost pudding ONS with meals. Malnutrition Assessment:  Malnutrition Status:  Severe malnutrition    Context:  Chronic Illness     Findings of the 6 clinical characteristics of malnutrition:  Energy Intake:  7 - 75% or less estimated energy requirements for 1 month or longer  Weight Loss:  7 - Greater than 10% over 6 months (16% in 9 months)     Body Fat Loss:  7 - Severe body fat loss Fat Overlying Ribs, Orbital, Buccal region, Triceps   Muscle Mass Loss:  7 - Severe muscle mass loss Clavicles (pectoralis & deltoids), Temples (temporalis)  Fluid Accumulation:  No significant fluid accumulation      Estimated Daily Nutrient Needs:  Energy (kcal):  9101-8735 kcals/day; Weight Used for Energy Requirements:  Current (30-35 kcals/kg)     Protein (g):  61-77 g/pro/day; Weight Used for Protein Requirements:  Current (1.2-1.5 g/kg)        Fluid (ml/day):  1956-5270 mL/fluid/day; Method Used for Fluid Requirements:  1 ml/kcal      Nutrition Related Findings:  Severe muscle/fat wasting observed        Current Nutrition Therapies:    DIET CARDIAC;  Dental Soft; Mildly Thick (Nectar)  Dietary Nutrition Supplements: Frozen Oral Supplement, Other Oral Supplement (see comment)    Anthropometric Measures:  · Height: 5' 9\" (175.3 cm)  · Current Body Weight: 112 lb (50.8 kg)   · Admission Body Weight: 112 lb (50.8 kg)    · Usual Body Weight: 134 lb (60.8 kg)     · Ideal Body Weight: 160 lbs  · BMI: 16.5  · BMI Categories: Underweight (BMI less than 22) age over 72       Nutrition Diagnosis:   · Severe malnutrition, In context of chronic illness related to inadequate protein-energy intake as evidenced by poor intake prior to admission, weight loss greater than or equal to 10% in 6 months, severe muscle loss, moderate loss of subcutaneous fat    Nutrition Interventions:   Food and/or Nutrient Delivery:  Modify Current Diet, Start Oral Nutrition Supplement  Nutrition Education/Counseling:  No recommendation at this time   Coordination of Nutrition Care:  Continue to monitor while inpatient    Goals:  Pt will consume 50% or greater of meals and ONS. Wt stable or appropriate gain.        Nutrition Monitoring and Evaluation:   Food/Nutrient Intake Outcomes:  Food and Nutrient Intake, Supplement Intake  Physical Signs/Symptoms Outcomes:  Biochemical Data, Chewing or Swallowing, Nutrition Focused Physical Findings, Weight     Discharge Planning:    Continue Oral Nutrition Supplement     Electronically signed by Vic Encarnacion MS, RD, LD on 5/25/21 at 2:51 PM CDT    Contact: [phone]

## 2021-05-25 NOTE — CARE COORDINATION
Spoke with Palliative Care team as well as pt's dtr. They are interested in Hospice services at SNF. Would like referrals to [facility] and [facility].    [facility] (formerly [address] [place] [address])   [phone] signed by Feliz Diamond on 5/25/2021 at 11:32 AM

## 2021-05-26 LAB
ANION GAP SERPL CALCULATED.3IONS-SCNC: 14 MMOL/L (ref 7–19)
BASOPHILS ABSOLUTE: 0 K/UL (ref 0–0.2)
BASOPHILS RELATIVE PERCENT: 0.3 % (ref 0–1)
BUN BLDV-MCNC: 32 MG/DL (ref 8–23)
CALCIUM SERPL-MCNC: 8.5 MG/DL (ref 8.2–9.6)
CHLORIDE BLD-SCNC: 110 MMOL/L (ref 98–111)
CO2: 20 MMOL/L (ref 22–29)
CREAT SERPL-MCNC: 0.5 MG/DL (ref 0.5–1.2)
EOSINOPHILS ABSOLUTE: 0 K/UL (ref 0–0.6)
EOSINOPHILS RELATIVE PERCENT: 0.1 % (ref 0–5)
GFR AFRICAN AMERICAN: >59
GFR NON-AFRICAN AMERICAN: >60
GLUCOSE BLD-MCNC: 85 MG/DL (ref 74–109)
HCT VFR BLD CALC: 46.4 % (ref 42–52)
HEMOGLOBIN: 15.1 G/DL (ref 14–18)
IMMATURE GRANULOCYTES #: 0 K/UL
LYMPHOCYTES ABSOLUTE: 1.2 K/UL (ref 1.1–4.5)
LYMPHOCYTES RELATIVE PERCENT: 16.3 % (ref 20–40)
MCH RBC QN AUTO: 34.2 PG (ref 27–31)
MCHC RBC AUTO-ENTMCNC: 32.5 G/DL (ref 33–37)
MCV RBC AUTO: 105 FL (ref 80–94)
MONOCYTES ABSOLUTE: 0.6 K/UL (ref 0–0.9)
MONOCYTES RELATIVE PERCENT: 7.3 % (ref 0–10)
NEUTROPHILS ABSOLUTE: 5.8 K/UL (ref 1.5–7.5)
NEUTROPHILS RELATIVE PERCENT: 75.5 % (ref 50–65)
PDW BLD-RTO: 13.6 % (ref 11.5–14.5)
PLATELET # BLD: 159 K/UL (ref 130–400)
PMV BLD AUTO: 11.8 FL (ref 9.4–12.4)
POTASSIUM REFLEX MAGNESIUM: 4.4 MMOL/L (ref 3.5–5)
RBC # BLD: 4.42 M/UL (ref 4.7–6.1)
SODIUM BLD-SCNC: 144 MMOL/L (ref 136–145)
WBC # BLD: 7.6 K/UL (ref 4.8–10.8)

## 2021-05-26 PROCEDURE — 85025 COMPLETE CBC W/AUTO DIFF WBC: CPT

## 2021-05-26 PROCEDURE — 6360000002 HC RX W HCPCS: Performed by: INTERNAL MEDICINE

## 2021-05-26 PROCEDURE — 2700000000 HC OXYGEN THERAPY PER DAY

## 2021-05-26 PROCEDURE — 2580000003 HC RX 258: Performed by: INTERNAL MEDICINE

## 2021-05-26 PROCEDURE — 80048 BASIC METABOLIC PNL TOTAL CA: CPT

## 2021-05-26 PROCEDURE — 36415 COLL VENOUS BLD VENIPUNCTURE: CPT

## 2021-05-26 PROCEDURE — 1210000000 HC MED SURG R&B

## 2021-05-26 PROCEDURE — 6360000002 HC RX W HCPCS: Performed by: STUDENT IN AN ORGANIZED HEALTH CARE EDUCATION/TRAINING PROGRAM

## 2021-05-26 PROCEDURE — 94640 AIRWAY INHALATION TREATMENT: CPT

## 2021-05-26 PROCEDURE — 6370000000 HC RX 637 (ALT 250 FOR IP): Performed by: STUDENT IN AN ORGANIZED HEALTH CARE EDUCATION/TRAINING PROGRAM

## 2021-05-26 RX ORDER — IPRATROPIUM BROMIDE AND ALBUTEROL SULFATE 2.5; .5 MG/3ML; MG/3ML
1 SOLUTION RESPIRATORY (INHALATION)
Status: DISCONTINUED | OUTPATIENT
Start: 2021-05-26 | End: 2021-05-27 | Stop reason: HOSPADM

## 2021-05-26 RX ORDER — LISINOPRIL 5 MG/1
2.5 TABLET ORAL DAILY
Status: DISCONTINUED | OUTPATIENT
Start: 2021-05-27 | End: 2021-05-27 | Stop reason: HOSPADM

## 2021-05-26 RX ORDER — FUROSEMIDE 10 MG/ML
20 INJECTION INTRAMUSCULAR; INTRAVENOUS ONCE
Status: COMPLETED | OUTPATIENT
Start: 2021-05-26 | End: 2021-05-26

## 2021-05-26 RX ADMIN — FUROSEMIDE 20 MG: 20 INJECTION, SOLUTION INTRAMUSCULAR; INTRAVENOUS at 10:00

## 2021-05-26 RX ADMIN — IPRATROPIUM BROMIDE AND ALBUTEROL SULFATE 1 AMPULE: .5; 3 SOLUTION RESPIRATORY (INHALATION) at 10:30

## 2021-05-26 RX ADMIN — IPRATROPIUM BROMIDE AND ALBUTEROL SULFATE 1 AMPULE: .5; 3 SOLUTION RESPIRATORY (INHALATION) at 15:20

## 2021-05-26 RX ADMIN — IPRATROPIUM BROMIDE AND ALBUTEROL SULFATE 1 AMPULE: .5; 3 SOLUTION RESPIRATORY (INHALATION) at 19:20

## 2021-05-26 RX ADMIN — SODIUM CHLORIDE, PRESERVATIVE FREE 1000 MG: 5 INJECTION INTRAVENOUS at 14:44

## 2021-05-26 RX ADMIN — SODIUM CHLORIDE, PRESERVATIVE FREE 10 ML: 5 INJECTION INTRAVENOUS at 10:00

## 2021-05-26 RX ADMIN — ENOXAPARIN SODIUM 40 MG: 40 INJECTION SUBCUTANEOUS at 10:00

## 2021-05-26 NOTE — PROGRESS NOTES
Name: Alysha Tejeda  MRN: [id number]  Date of Service:  5/26/2021 05/26/21 1122   Subjective   Subjective Attempted in AM. Pt daughter states he has been sleeping all morning. Plan to try again in PM if possible.     PT Whiteboard Notes   Therapy Whiteboard PNA, DEMENTIA, 02 RE 6-8           Electronically signed by Clifton Ellis PTA on 5/26/2021 at 11:24 AM

## 2021-05-26 NOTE — PROGRESS NOTES
Room air pulse ox at rest 93%  Pt weak and confused and unable to walk at this time or do incentive spirometer  Oxygen left off

## 2021-05-26 NOTE — PROGRESS NOTES
Found patient with 3lpm nasal can back on, pulse ox 98%    Took off patient for 30minutes and pulse ox on room air at rest stayed at 93%.

## 2021-05-26 NOTE — CARE COORDINATION
[facility] has no male beds at this time. Bed offer from [facility]. Will speak with pt's dtr about transfer to facility under hospice care. [address]  Electronically signed by Savanna Cox on 5/26/2021 at 12:40 PM     Dtr mathew with [facility] pending paperwork.    Electronically signed by Savanna Cox on 5/26/2021 at 3:08 PM

## 2021-05-26 NOTE — PROGRESS NOTES
Palliative care follow up note. Pt is asleep and appears comfortable at this time. Dtr requested palliative care to visit. Pt dtr/POA, John Don, talks with me about her hope of pt being able to go to [facility] from hospital with hospice to follow. Long discussion with her about the process of trans. She only requests pt be allowed to go and be comfortable. She does not see need to try to \"Rehab\" d/t his decline and age. We discussed costs from standpoint of Medicare covers hospice but pt would be responsible for room and board. She voiced understanding. We also discussed pt med management will be provided at facility and pt would most likely not come to hospital since hospice can manage care along with [facility]. Dtr is in agreement. Spoke with RNCM on 4th about dtrs wishes for comfort care. She is in contact with [facility]. Pt nurse will request hospice consult. Will notify Anastacio Huerta as well. Dtr encouraged to call palliative should she have any further questions.   Electronically signed by Kassandra Sequeira RN on 5/26/2021 at 11:22 AM

## 2021-05-26 NOTE — PROGRESS NOTES
Dose Route Frequency Provider Last Rate Last Admin    furosemide (LASIX) injection 20 mg  20 mg Intravenous Once Rik Lo MD        ipratropium-albuterol (DUONEB) nebulizer solution 1 ampule  1 ampule Inhalation Q4H WA Rik Lo MD        sodium chloride flush 0.9 % injection 5-40 mL  5-40 mL Intravenous 2 times per day Sven Sharma MD   10 mL at 05/25/21 2100    sodium chloride flush 0.9 % injection 5-40 mL  5-40 mL Intravenous PRN Sven Sharma MD        0.9 % sodium chloride infusion  25 mL Intravenous PRN Sven MD Zachary        enoxaparin (LOVENOX) injection 40 mg  40 mg Subcutaneous Daily Sven MD Zachary   40 mg at 05/25/21 0858    promethazine (PHENERGAN) tablet 12.5 mg  12.5 mg Oral Q6H PRN Sven Sharma MD        Or    [facility]ron [facility]) injection 4 mg  4 mg Intravenous Q6H PRN Sven MD Zachary        polyethylene glycol (GLYCOLAX) packet 17 g  17 g Oral Daily PRN Sven MD Zachary        acetaminophen (TYLENOL) tablet 650 mg  650 mg Oral Q6H PRN Sven MD Zachary   650 mg at 05/24/21 1644    Or    acetaminophen (TYLENOL) suppository 650 mg  650 mg Rectal Q6H PRN Sven Sharma MD        cefTRIAXone (ROCEPHIN) 1,000 mg in sodium chloride (PF) 10 mL IV syringe  1,000 mg Intravenous Q24H Sven Sharma MD   1,000 mg at 05/25/21 1455         sodium chloride        furosemide  20 mg Intravenous Once    ipratropium-albuterol  1 ampule Inhalation Q4H WA    sodium chloride flush  5-40 mL Intravenous 2 times per day    enoxaparin  40 mg Subcutaneous Daily    cefTRIAXone (ROCEPHIN) IV  1,000 mg Intravenous Q24H     sodium chloride flush, sodium chloride, promethazine **OR** ondansetron, polyethylene glycol, acetaminophen **OR** acetaminophen  DIET CARDIAC;  Dental Soft; Mildly Thick (Nectar)  Dietary Nutrition Supplements: Frozen Oral Supplement, Other Oral Supplement (see comment)       Labs:   CBC with DIFF:  Recent Labs 05/24/21  1211 05/25/21  0509 05/26/21  0435   WBC 4.5* 4.9 7.6   RBC 4.29* 3.89* 4.42*   HGB 14.6 13.1* 15.1   HCT 42.7 39.3* 46.4   MCV 99.5* 101.0* 105.0*   MCH 34.0* 33.7* 34.2*   MCHC 34.2 33.3 32.5*   RDW 14.0 14.0 13.6    136 159   MPV 11.7 12.0 11.8   NEUTOPHILPCT 79.9* 55.0 75.5*   LYMPHOPCT 12.4* 32.0 16.3*   MONOPCT 6.9 1.0 7.3   EOSRELPCT 0.0 0.0 0.1   BASOPCT 0.4 0.0 0.3   NEUTROABS 3.6 3.0 5.8   LYMPHSABS 0.6* 1.9 1.2   MONOSABS 0.30 0.00 0.60   EOSABS 0.00 0.00 0.00   BASOSABS 0.00 0.00 0.00       CMP/BMP:  Recent Labs     05/24/21  1211 05/24/21  1225 05/25/21  0633 05/26/21  0435     --  144 144   K 3.9 3.4 3.3* 4.4     --  110 110   CO2 27  --  27 20*   ANIONGAP 9  --  7 14   GLUCOSE 117*  --  94 85   BUN 44*  --  39* 32*   CREATININE 0.7  --  0.6 0.5   LABGLOM >60  --  >60 >60   CALCIUM 9.0  --  8.1* 8.5   PROT 6.0*  --   --   --    LABALBU 2.7*  --   --   --    BILITOT 1.0  --   --   --    ALKPHOS 55  --   --   --    ALT 11  --   --   --    AST 23  --   --   --          CRP:    Recent Labs     05/24/21 1802   CRP 9.85*     Sed Rate:    Recent Labs     05/24/21 1802   SEDRATE 31*         HgBA1c:  No components found for: HGBA1C  FLP:  No results found for: TRIG, HDL, LDLCALC, LDLDIRECT, LABVLDL  TSH:  No results found for: TSH  Troponin T:   Recent Labs     05/25/21  0849   TROPONINI 0.06*     Pro-BNP: No results for input(s): BNP in the last 72 hours. INR: No results for input(s): INR in the last 72 hours. ABGs:   Lab Results   Component Value Date    PHART 7.510 05/24/2021    PO2ART 40.0 05/24/2021    RGT2IZJ 33.0 05/24/2021     UA:No results for input(s): NITRITE, COLORU, PHUR, LABCAST, WBCUA, RBCUA, MUCUS, TRICHOMONAS, YEAST, BACTERIA, CLARITYU, SPECGRAV, LEUKOCYTESUR, UROBILINOGEN, BILIRUBINUR, BLOODU, GLUCOSEU, AMORPHOUS in the last 72 hours. Invalid input(s): Atilio Del Valle      Culture Results:    No results for input(s): CXSURG in the last 720 hours.     Blood Culture Recent:   Recent Labs     05/24/21  1320   BC No Growth to date. Any change in status will be called. Cultures:   No results for input(s): CULTURE in the last 72 hours. Recent Labs     05/24/21  1320 05/24/21  1327   BC No Growth to date. Any change in status will be called. --    BLOODCULT2  --  No Growth to date. Any change in status will be called. No results for input(s): CXSURG in the last 72 hours. Recent Labs     05/25/21  0633   MG 2.2     Recent Labs     05/24/21  1211   AST 23   ALT 11   BILITOT 1.0   ALKPHOS 55         RAD:   CT CHEST WO CONTRAST    Result Date: 5/25/2021  CT CHEST WO CONTRAST 5/24/2021 5:49 PM HISTORY: Shortness of air and hypoxia. COMPARISON: None DLP: 268 mGy cm. Automated exposure control was utilized to diminish patient radiation dose. TECHNIQUE: Serial helical tomographic images of the chest were acquired. Bone and soft tissue algorithms were provided. Coronal reformatted images were also provided for review. FINDINGS: Neck base: The imaged portion of the neck and thyroid gland is unremarkable. There is extensive spondylosis of the lower cervical spine. Lungs: Bilateral lower lobe and right middle lobe pneumonia are present. Trace bilateral pleural effusions are present. Left lingular atelectasis is demonstrated. . Trace pleural effusions are present. . The trachea and bronchial tree are patent. Heart: There is mild cardiomegaly with coronary artery calcifications. . There is no pericardial effusion. Great vessels: The great vessels are normal in caliber. Lymph nodes: No significant hilar, mediastinal or axillary lymphadenopathy is appreciated. Skeletal and soft tissues: A mild compression deformity is noted at the T3 and T7 level. These appear to be old in nature. There is an accentuated thoracic kyphosis. . A 4.2 x 3.4 cm lipoma projects over the upper left chest just medial to the scapular border.  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 Upper abdomen: The imaged portion of the upper abdomen demonstrates no acute process. No evidence of free air within the upper abdomen. 1. Bilateral lower lobe and right middle lobe pneumonia is present. There is rather dense consolidation within the lower lobes. Trace bilateral pleural effusions are present. 2. Mild cardiomegaly with coronary calcifications. The thoracic aorta and proximal great vessels are remarkable for atheromatous calcification without evidence of aneurysm. 3. Lipoma projecting over the upper posterior left chest near the medial scapular border. There is advanced arthritic change of the glenohumeral joints. 4. Compression deformities are noted at T3 and T7 felt to be chronic in nature. There is an accentuated thoracic kyphosis. . Signed by Dr Claudette End on 5/25/2021 12:15 AM    XR CHEST PORTABLE    Result Date: 5/24/2021  Examination. XR CHEST PORTABLE 5/24/2021 11:08 AM History: Abnormal chest x-ray at an outside facility. A single frontal portable upright view of the chest is compared with the previous study dated 9/8/2018. There is bilateral lower lung and parahilar infiltrate which was not seen in the previous study. There is no pleural effusion, pulmonary congestion or pneumothorax. The heart size is not evaluated due to the portable projection. The atheromatous changes of thoracic aorta are noted. There is moderate diffuse osteopenia. There is no acute bony abnormality. Bilateral lung infiltrate may represent an inflammatory/infectious process. Further follow-up is recommended. Signed by Dr Alley Chao on 5/24/2021 12:41 PM      Echo: 05/24/2021  Summary   Mitral valve leaflets are mildly thickened with preserved leaflet   mobility. Mild mitral regurgitation is present. Mildly thickened aortic valve leaflets with preserved leaflet mobility. Mild aortic regurgitation is noted.    Tricuspid valve is structurally normal.   Mild to moderate tricuspid regurgitation with estimated RVSP of 21 mm Hg. Left ventricular ejection fraction is visually estimated at 45%. Mild concentric left ventricular hypertrophy. Impaired relaxation compatible with diastolic dysfunction. ( reversed E/A   ratio)   No regional wall motion abnormalities. Signature      ----------------------------------------------------------------   Electronically signed by Ruslan Ramirez MD(Interpreting   physician) on 05/25/2021 08:30 AM   ----------------------------------------------------------------           Objective:   Vitals:   /74   Pulse 73   Temp 97.2 °F (36.2 °C) (Temporal)   Resp 16   Ht 5' 9\" (1.753 m)   Wt 112 lb 8 oz (51 kg)   SpO2 97%   BMI 16.61 kg/m²     Patient Vitals for the past 24 hrs:   BP Temp Temp src Pulse Resp SpO2   05/26/21 0757 139/74 97.2 °F (36.2 °C) Temporal 73 16 97 %   05/26/21 0005 123/71 98 °F (36.7 °C) Temporal 66 14 92 %   05/25/21 1930 -- -- -- 72 -- --   05/25/21 1819 125/79 98.5 °F (36.9 °C) Temporal 67 16 92 %   05/25/21 1228 108/73 97.1 °F (36.2 °C) Temporal 66 17 94 %       24HR INTAKE/OUTPUT:      Intake/Output Summary (Last 24 hours) at 5/26/2021 0852  Last data filed at 5/26/2021 0005  Gross per 24 hour   Intake 240 ml   Output 400 ml   Net -160 ml       Physical Exam  Vitals and nursing note reviewed. Constitutional:       General: He is not in acute distress. Appearance: Normal appearance. He is not toxic-appearing or diaphoretic. Comments: Cachectic   HENT:      Head: Normocephalic and atraumatic. Comments: Decreased hearing     Right Ear: External ear normal.      Left Ear: External ear normal.      Nose: Nose normal. No congestion or rhinorrhea. Mouth/Throat:      Mouth: Mucous membranes are moist.      Pharynx: Oropharynx is clear. No oropharyngeal exudate or posterior oropharyngeal erythema. Eyes:      General: No scleral icterus. Right eye: No discharge. partial resection of colon 5/26/2021 Yes    Palliative care patient 5/25/2021 Yes    Hypokalemia 5/25/2021 Yes          Principal Problem:    Multifocal pneumonia  Active Problems:    Severe malnutrition (HCC)    S/P partial resection of colon    Palliative care patient    Hypokalemia  Resolved Problems:    * No resolved hospital problems. *      Multifocal bacterial pneumonia  Elevated procalcitonin noted  Continue Rocephin monotherapy    ?   New onset decompensated heart failure, significantly elevated proBNP with trace bilateral pleural effusions  Spot diuresis and monitor urine output  Cardiology recommendations noted  Add ACE inhibitor to regimen prior to discharge  Echocardiogram reviewed  Follow-up with cardiology outpatient, consider further ischemic evaluation at that time    Acute hypoxic respiratory failure, secondary to above  As needed supplemental O2  Wean supplemental O2 as able  Nebulized bronchodilators  Incentive spirometry  May need home O2 assessment    Hypokalemia  Repleted, monitor    Malnutrition  Nutritional support    Generalized weakness  PT/OT    DVT prophylaxis with Lovenox    Plan for nursing facility placement with hospice to follow       Electronically signed by   Dalia Reddy MD,    5/26/2021 8:52 AM

## 2021-05-27 VITALS
TEMPERATURE: 98.8 F | BODY MASS INDEX: 16.66 KG/M2 | DIASTOLIC BLOOD PRESSURE: 66 MMHG | SYSTOLIC BLOOD PRESSURE: 108 MMHG | HEART RATE: 86 BPM | RESPIRATION RATE: 16 BRPM | HEIGHT: 69 IN | WEIGHT: 112.5 LBS | OXYGEN SATURATION: 92 %

## 2021-05-27 LAB
ANION GAP SERPL CALCULATED.3IONS-SCNC: 8 MMOL/L (ref 7–19)
BASOPHILS ABSOLUTE: 0 K/UL (ref 0–0.2)
BASOPHILS RELATIVE PERCENT: 0.1 % (ref 0–1)
BUN BLDV-MCNC: 26 MG/DL (ref 8–23)
CALCIUM SERPL-MCNC: 8.1 MG/DL (ref 8.2–9.6)
CHLORIDE BLD-SCNC: 110 MMOL/L (ref 98–111)
CO2: 29 MMOL/L (ref 22–29)
CREAT SERPL-MCNC: 0.5 MG/DL (ref 0.5–1.2)
EOSINOPHILS ABSOLUTE: 0 K/UL (ref 0–0.6)
EOSINOPHILS RELATIVE PERCENT: 0 % (ref 0–5)
GFR AFRICAN AMERICAN: >59
GFR NON-AFRICAN AMERICAN: >60
GLUCOSE BLD-MCNC: 84 MG/DL (ref 74–109)
HCT VFR BLD CALC: 40.7 % (ref 42–52)
HEMOGLOBIN: 13.8 G/DL (ref 14–18)
IMMATURE GRANULOCYTES #: 0 K/UL
LYMPHOCYTES ABSOLUTE: 1 K/UL (ref 1.1–4.5)
LYMPHOCYTES RELATIVE PERCENT: 13.5 % (ref 20–40)
MAGNESIUM: 2.2 MG/DL (ref 1.7–2.3)
MCH RBC QN AUTO: 33.7 PG (ref 27–31)
MCHC RBC AUTO-ENTMCNC: 33.9 G/DL (ref 33–37)
MCV RBC AUTO: 99.3 FL (ref 80–94)
MONOCYTES ABSOLUTE: 0.5 K/UL (ref 0–0.9)
MONOCYTES RELATIVE PERCENT: 7.5 % (ref 0–10)
NEUTROPHILS ABSOLUTE: 5.6 K/UL (ref 1.5–7.5)
NEUTROPHILS RELATIVE PERCENT: 78.5 % (ref 50–65)
PDW BLD-RTO: 13.6 % (ref 11.5–14.5)
PLATELET # BLD: 143 K/UL (ref 130–400)
PMV BLD AUTO: 11.5 FL (ref 9.4–12.4)
POTASSIUM REFLEX MAGNESIUM: 2.8 MMOL/L (ref 3.5–5)
RBC # BLD: 4.1 M/UL (ref 4.7–6.1)
SODIUM BLD-SCNC: 147 MMOL/L (ref 136–145)
WBC # BLD: 7.1 K/UL (ref 4.8–10.8)

## 2021-05-27 PROCEDURE — 6370000000 HC RX 637 (ALT 250 FOR IP): Performed by: STUDENT IN AN ORGANIZED HEALTH CARE EDUCATION/TRAINING PROGRAM

## 2021-05-27 PROCEDURE — 6360000002 HC RX W HCPCS: Performed by: INTERNAL MEDICINE

## 2021-05-27 PROCEDURE — 36415 COLL VENOUS BLD VENIPUNCTURE: CPT

## 2021-05-27 PROCEDURE — 85025 COMPLETE CBC W/AUTO DIFF WBC: CPT

## 2021-05-27 PROCEDURE — 6360000002 HC RX W HCPCS: Performed by: STUDENT IN AN ORGANIZED HEALTH CARE EDUCATION/TRAINING PROGRAM

## 2021-05-27 PROCEDURE — 83735 ASSAY OF MAGNESIUM: CPT

## 2021-05-27 PROCEDURE — 94640 AIRWAY INHALATION TREATMENT: CPT

## 2021-05-27 PROCEDURE — 80048 BASIC METABOLIC PNL TOTAL CA: CPT

## 2021-05-27 RX ORDER — POTASSIUM CHLORIDE 7.45 MG/ML
10 INJECTION INTRAVENOUS PRN
Status: DISCONTINUED | OUTPATIENT
Start: 2021-05-27 | End: 2021-05-27 | Stop reason: HOSPADM

## 2021-05-27 RX ORDER — LEVOFLOXACIN 750 MG/1
750 TABLET ORAL DAILY
Status: DISCONTINUED | OUTPATIENT
Start: 2021-05-27 | End: 2021-05-27 | Stop reason: HOSPADM

## 2021-05-27 RX ORDER — LORAZEPAM 2 MG/ML
1 INJECTION INTRAMUSCULAR
Status: COMPLETED | OUTPATIENT
Start: 2021-05-27 | End: 2021-05-27

## 2021-05-27 RX ORDER — POTASSIUM CHLORIDE 20 MEQ/1
40 TABLET, EXTENDED RELEASE ORAL PRN
Status: DISCONTINUED | OUTPATIENT
Start: 2021-05-27 | End: 2021-05-27 | Stop reason: HOSPADM

## 2021-05-27 RX ORDER — LEVOFLOXACIN 750 MG/1
750 TABLET ORAL DAILY
Qty: 5 TABLET | Refills: 0 | Status: SHIPPED | OUTPATIENT
Start: 2021-05-27 | End: 2021-06-01

## 2021-05-27 RX ADMIN — ENOXAPARIN SODIUM 40 MG: 40 INJECTION SUBCUTANEOUS at 08:11

## 2021-05-27 RX ADMIN — IPRATROPIUM BROMIDE AND ALBUTEROL SULFATE 1 AMPULE: .5; 3 SOLUTION RESPIRATORY (INHALATION) at 11:37

## 2021-05-27 RX ADMIN — LORAZEPAM 1 MG: 2 INJECTION, SOLUTION INTRAMUSCULAR; INTRAVENOUS at 14:25

## 2021-05-27 RX ADMIN — IPRATROPIUM BROMIDE AND ALBUTEROL SULFATE 1 AMPULE: .5; 3 SOLUTION RESPIRATORY (INHALATION) at 06:29

## 2021-05-27 RX ADMIN — LISINOPRIL 2.5 MG: 5 TABLET ORAL at 08:11

## 2021-05-27 RX ADMIN — POTASSIUM CHLORIDE 10 MEQ: 7.46 INJECTION, SOLUTION INTRAVENOUS at 08:12

## 2021-05-27 NOTE — PROGRESS NOTES
Name: Sugar Reeves  MRN: [id number]  Date of Service:  5/27/2021 05/27/21 1026   Subjective   Subjective Attempted in AM. Daughter reports pt was agitated last night and she thinks it would be best to leave him in bed. Daughter reports pt will be d/c to [facility], doesn't know when.     PT Whiteboard Notes   Therapy Whiteboard PNA, DEMENTIA, 02 RE 6-8         Electronically signed by Liliya Donahue PTA on 5/27/2021 at 10:31 AM

## 2021-05-27 NOTE — PROGRESS NOTES
Pt dtr request PC RN to speak with her. Dtr has questions about process of transfer to . Explained pt would go by ambulance. Once he is dc'd and leaving facility hospice will be contacted. In turn [facility] will contact her for appt to admit. She was asking about meds d/t pt has not been eating. I did explain to her that should pt have s/s of discomfort, agitation there were meds that could be given sub lingual.   Dtr is tearful. Emotional support provided. Pt does occasionally open eyes, moan and/or smile. He appears comfortable at present. Dtr does say pt was agitated this morning and she is concerned he will \"fire up\" before he leaves and get meds at . Assured her I would ask MD for meds prior to transfer if indicated. Pt nurse is also aware. No other questions from dtr. She is appreciative of care and PC involvement in helping her understand EOL process.   Electronically signed by Melissa Santana RN on 5/27/2021 at 11:06 AM

## 2021-05-27 NOTE — DISCHARGE SUMMARY
Discharge Summary    NAME: Evangelista Nieto  :  6/15/1925  MRN:  [id number]    Admit date:  2021  Discharge date:      Admitting Physician:  Bonifacio Moyer MD    Advance Directive: [facility]    Consults: cardiology  Palliative care    Primary Care Physician:  Alicia Duffy    Discharge Diagnoses:  Principal Problem:    Multifocal pneumonia  Acute decompensated heart failure, unknown EF    Severe malnutrition ([address]    S/P partial resection of colon    Palliative care patient    Hypokalemia      Significant Diagnostic Studies:   CT CHEST WO CONTRAST    Result Date: 2021  CT CHEST WO CONTRAST 2021 5:49 PM HISTORY: Shortness of air and hypoxia. COMPARISON: None DLP: 268 mGy cm. Automated exposure control was utilized to diminish patient radiation dose. TECHNIQUE: Serial helical tomographic images of the chest were acquired. Bone and soft tissue algorithms were provided. Coronal reformatted images were also provided for review. FINDINGS: Neck base: The imaged portion of the neck and thyroid gland is unremarkable. There is extensive spondylosis of the lower cervical spine. Lungs: Bilateral lower lobe and right middle lobe pneumonia are present. Trace bilateral pleural effusions are present. Left lingular atelectasis is demonstrated. . Trace pleural effusions are present. . The trachea and bronchial tree are patent. Heart: There is mild cardiomegaly with coronary artery calcifications. . There is no pericardial effusion. Great vessels: The great vessels are normal in caliber. Lymph nodes: No significant hilar, mediastinal or axillary lymphadenopathy is appreciated. Skeletal and soft tissues: A mild compression deformity is noted at the T3 and T7 level. These appear to be old in nature. There is an accentuated thoracic kyphosis. . A 4.2 x 3.4 cm lipoma projects over the upper left chest just medial to the scapular border.  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 Upper abdomen: The imaged portion of the upper abdomen demonstrates no acute process. No evidence of free air within the upper abdomen. 1. Bilateral lower lobe and right middle lobe pneumonia is present. There is rather dense consolidation within the lower lobes. Trace bilateral pleural effusions are present. 2. Mild cardiomegaly with coronary calcifications. The thoracic aorta and proximal great vessels are remarkable for atheromatous calcification without evidence of aneurysm. 3. Lipoma projecting over the upper posterior left chest near the medial scapular border. There is advanced arthritic change of the glenohumeral joints. 4. Compression deformities are noted at T3 and T7 felt to be chronic in nature. There is an accentuated thoracic kyphosis. . Signed by Dr Mor Copeland on 5/25/2021 12:15 AM    XR CHEST PORTABLE    Result Date: 5/24/2021  Examination. XR CHEST PORTABLE 5/24/2021 11:08 AM History: Abnormal chest x-ray at an outside facility. A single frontal portable upright view of the chest is compared with the previous study dated 9/8/2018. There is bilateral lower lung and parahilar infiltrate which was not seen in the previous study. There is no pleural effusion, pulmonary congestion or pneumothorax. The heart size is not evaluated due to the portable projection. The atheromatous changes of thoracic aorta are noted. There is moderate diffuse osteopenia. There is no acute bony abnormality. Bilateral lung infiltrate may represent an inflammatory/infectious process. Further follow-up is recommended.  Signed by Dr Cherrie Wu on 5/24/2021 12:41 PM    ECHO 2D WO COLOR DOPPLER COMPLETE    Result Date: 5/25/2021  Transthoracic Echocardiography Report (TTE)  Demographics   Patient Name   Alex Plummer  Date of Study           05/25/2021   MRN            [id number]         Gender                  Male   Date of Birth  06/15/1925     Room Number [facility]-0430   Age            89 year(s)   Height:        69 inches      Referring Physician     Adams Mcnally MD   Weight:        112 pounds     Sonographer             Letitia Woods   BSA:           1.61 m^2       Interpreting Physician  Fidelia Nava MD   BMI:           16.54 kg/m^2  Procedure Type of Study   TTE procedure:ECHOCARDIOGRAM COMPLETE 2D WO COLOR DOPPLER. Study Location: Echo Lab Technical Quality: Limited visualization due to lung interface. Patient Status: Inpatient Rhythm: Within normal limits Indications:Dyspnea/SOB and Elevated BNP. Conclusions   Summary  Mitral valve leaflets are mildly thickened with preserved leaflet  mobility. Mild mitral regurgitation is present. Mildly thickened aortic valve leaflets with preserved leaflet mobility. Mild aortic regurgitation is noted. Tricuspid valve is structurally normal.  Mild to moderate tricuspid regurgitation with estimated RVSP of 21 mm Hg. Left ventricular ejection fraction is visually estimated at 45%. Mild concentric left ventricular hypertrophy. Impaired relaxation compatible with diastolic dysfunction. ( reversed E/A  ratio)  No regional wall motion abnormalities. Signature   ----------------------------------------------------------------  Electronically signed by Fidelia Nava MD(Interpreting  physician) on 05/25/2021 08:30 AM  ----------------------------------------------------------------   Findings   Mitral Valve  Mitral valve leaflets are mildly thickened with preserved leaflet  mobility. Mild mitral regurgitation is present. Aortic Valve  Mildly thickened aortic valve leaflets with preserved leaflet mobility. Mild aortic regurgitation is noted. Tricuspid Valve  Tricuspid valve is structurally normal.  Mild to moderate tricuspid regurgitation with estimated RVSP of 21 mm Hg. Pulmonic Valve  The pulmonic valve was not well visualized . Left Atrium  Normal size left atrium.    Left Ventricle  Left ventricular ejection fraction is visually estimated at 45%. Mild concentric left ventricular hypertrophy. Impaired relaxation compatible with diastolic dysfunction. ( reversed E/A  ratio)  No regional wall motion abnormalities. Right Atrium  Normal right atrial dimension with no evidence of thrombus or mass noted. Right Ventricle  Normal right ventricular size with preserved RV function. Pericardial Effusion  No evidence of significant pericardial effusion is noted. Pleural Effusion  No evidence of pleural effusion. Miscellaneous  Technically difficult study  M-Mode Measurements (cm)   LVIDd: 3.4 cm                          LVIDs: 2.66 cm  IVSd: 1.26 cm  LVPWd: 1.02 cm                         AO Root Dimension: 3 cm  % Ejection Fraction: 45 %              LA: 2.9 cm                                         LVOT: 2 cm  Doppler Measurements:   AV Peak Velocity:66 cm/s             MV Peak E-Wave: 42.8 cm/s  AV Peak Gradient: 1.74 mmHg          MV Peak A-Wave: 55.7 cm/s  AV Mean Gradient: 1 mmHg             MV E/A Ratio: 0.77 %  AV Area (Continuity):3.14 cm^2       MV Peak Gradient: 0.73 mmHg  TR Velocity:217 cm/s  TR Gradient:18.84 mmHg  Estimated RAP:3 mmHg  RVSP:21 mmHg        Pertinent Labs:   CBC:   Recent Labs     05/25/21  0509 05/26/21  0435 05/27/21  0431   WBC 4.9 7.6 7.1   HGB 13.1* 15.1 13.8*    159 143     BMP:    Recent Labs     05/25/21  0633 05/26/21  0435 05/27/21  0431    144 147*   K 3.3* 4.4 2.8*    110 110   CO2 27 20* 29   BUN 39* 32* 26*   CREATININE 0.6 0.5 0.5   GLUCOSE 94 85 84     INR: No results for input(s): INR in the last 72 hours. Lipids: No results for input(s): CHOL, HDL in the last 72 hours. Invalid input(s): LDLCALCU  ABGs:  No results for input(s): PHART, TKM4IUB, PO2ART, GWK7NGS, BEART, HGBAE, Q3ATNJJG, CARBOXHGBART, 02THERAPY in the last 72 hours.         Hospital Course: 40-year-old male with severe malnutrition, previous colon resection, previous right hip fracture, gradual decline over the preceding 6 months with generalized weakness, poor appetite and weight loss, presented 5/24 with worsening shortness of breath and associated productive cough and fever. Admitted with multifocal pneumonia with CT chest showing bilateral lower lobe and right middle lobe infiltrates with dense consolidation in lower lobes. Treated with Rocephin. Azithromycin discontinued as no evidence of atypical infection, respiratory PCR panel negative. Additionally concern for development of heart failure with elevated proBNP over 3000, trace bilateral effusions, and echo showing EF 45%. Evaluated by cardiology who recommended medical management without any further ischemic evaluation at this time. Patient remains with significant debility, overall decline and severe malnutrition. After discussion with palliative care, opted to proceed with hospice care. Patient accepted to SNF at [facility] with hosp[facility] care.       Physical Exam:  Vital Signs: /66   Pulse 86   Temp 98.8 °F (37.1 °C) (Temporal)   Resp 16   Ht 5' 9\" (1.753 m)   Wt 112 lb 8 oz (51 kg)   SpO2 92%   BMI 16.61 kg/m²   General appearance:. Chronically ill-appearing and debilitated  HEENT: Normocephalic. Atraumatic  Chest: Coarse breath sounds bilaterally  Cardiac: Normal heart rate, pulses equal  Abdomen:soft, non-tender; normal bowel sounds  Extremities: No clubbing or cyanosis  Neurologic: No appreciable focal deficits    Discharge Medications:       Georgia Salazar 80 Medication Instructions APY:[id number]    Printed on:05/27/21 1608   Medication Information                      Cholecalciferol (VITAMIN D) 50 MCG (2000 UT) CAPS capsule  Take 1 capsule by mouth daily             finasteride (PROSCAR) 5 MG tablet  Take 5 mg by mouth daily             levoFLOXacin (LEVAQUIN) 750 MG tablet  Take 1 tablet by mouth daily for 5 days                 Discharge Instructions: Follow up with Fabiola Gallego as needed.   Follow up with Physician at SNF. Take medications as directed. Resume activity as tolerated. Diet: DIET CARDIAC; Dental Soft; Mildly Thick (Nectar)  Dietary Nutrition Supplements: Frozen Oral Supplement, Other Oral Supplement (see comment)     Disposition: Patient is medically stable and will be discharged to SNF. Time spent on discharge 40 minutes.       Signed:  Sylvester Carmona MD  5/27/2021 4:04 PM

## 2021-05-27 NOTE — CARE COORDINATION
Pt can dc to [facility] with hospice care today, 5/27.   [facility]   [address] [place] [address]  Electronically signed by Lou Cristina on 5/27/2021 at 9:54 AM

## 2021-05-27 NOTE — PROGRESS NOTES
Ativan 1mg administered prior to EMS transport.  Peripheral IV removed after, per Stacy Martínez RN request  Electronically signed by Marissa Moran RN on 5/27/2021 at 2:26 PM

## 2021-05-27 NOTE — CONSULTS
Nutrition Assessment     Type and Reason for Visit: Reassess, Consult    Nutrition Recommendations/Plan: Continue current POC while admitted. Recommend cont current soft diet and ONS at d/c. Nutrition Assessment:  Consult recieved for poor po intake- please see RD assessment from 5/25. Noted pt planned d/c today 5/27, to SNF with Hospice care. Continue current diet and ONS at this time. Malnutrition Assessment:  Malnutrition Status: Severe malnutrition    Current Nutrition Therapies:    DIET CARDIAC;  Dental Soft; Mildly Thick (Nectar)  Dietary Nutrition Supplements: Frozen Oral Supplement, Other Oral Supplement (see comment)    Anthropometric Measures:  · Height: 5' 9\" (175.3 cm)  · Current Body Wt: 112 lb (50.8 kg)   · BMI: 16.5    Nutrition Interventions:   Food and/or Nutrient Delivery:  Continue Current Diet, Continue Oral Nutrition Supplement  Coordination of Nutrition Care:  Continue to monitor while inpatient    Nutrition Monitoring and Evaluation:   Food/Nutrient Intake Outcomes:  Food and Nutrient Intake, Supplement Intake  Physical Signs/Symptoms Outcomes:  Biochemical Data, Chewing or Swallowing, Nutrition Focused Physical Findings, Weight     Discharge Planning:    Continue Oral Nutrition Supplement, Continue current diet     Electronically signed by Greta Mendez MS, RD, LD on 5/27/21 at 10:05 AM CDT    Contact: [phone]

## 2021-05-29 LAB
BLOOD CULTURE, ROUTINE: NORMAL
CULTURE, BLOOD 2: NORMAL

## 2021-06-17 ENCOUNTER — CARE COORDINATION (OUTPATIENT)
Dept: CARE COORDINATION | Age: 86
End: 2021-06-17

## 2021-06-17 ENCOUNTER — CARE COORDINATION (OUTPATIENT)
Dept: CASE MANAGEMENT | Age: 86
End: 2021-06-17

## (undated) DEVICE — PIN FIX L305MM DIA3MM TEMP THRD

## (undated) DEVICE — SHOULDER CDS

## (undated) DEVICE — COVER POS PERINL POST NS 082501

## (undated) DEVICE — SUTURE VCRL SZ 3-0 L27IN ABSRB UD L24MM FS-1 3/8 CIR REV J442H

## (undated) DEVICE — SUTURE SZ 0 27IN 5/8 CIR UR-6  TAPER PT VIOLET ABSRB VICRYL J603H

## (undated) DEVICE — GLOVE SURG SZ 85 L12IN FNGR THK13MIL BRN LTX SYN POLYMER W

## (undated) DEVICE — ABDOMINAL PAD: Brand: DERMACEA

## (undated) DEVICE — SUTURE VCRL SZ 2-0 L36IN ABSRB UD L36MM CT-1 1/2 CIR J945H

## (undated) DEVICE — 3M™ IOBAN™ 2 ANTIMICROBIAL INCISE DRAPE 6650EZ: Brand: IOBAN™ 2

## (undated) DEVICE — SYSTEM SKIN CLSR 22CM DERMBND PRINEO

## (undated) DEVICE — VITAL SIGNS™ ADULT FACE MASK WITH ADJUSTABLE AIR CUSHION - SIZE 5: Brand: VITAL SIGNS™

## (undated) DEVICE — TOWEL,OR,DSP,ST,BLUE,DLX,4/PK,20PK/CS: Brand: MEDLINE

## (undated) DEVICE — BIT DRILL 4.3MM

## (undated) DEVICE — GUIDE PIN 3.2MM

## (undated) DEVICE — SUTURE MCRYL SZ 3 0 L18IN ABSRB UD PS 2 3 8 CIR REV CUT NDL MCP497G

## (undated) DEVICE — SOLUTION IV IRRIG POUR BRL 0.9% SODIUM CHL 2F7124

## (undated) DEVICE — GLOVE SURG SZ 85 L12IN FNGR THK87MIL DK GRN LTX FREE ISOLEX